# Patient Record
Sex: FEMALE | Race: BLACK OR AFRICAN AMERICAN | NOT HISPANIC OR LATINO | URBAN - METROPOLITAN AREA
[De-identification: names, ages, dates, MRNs, and addresses within clinical notes are randomized per-mention and may not be internally consistent; named-entity substitution may affect disease eponyms.]

---

## 2020-06-09 ENCOUNTER — TELEPHONE ENCOUNTER (OUTPATIENT)
Dept: URBAN - METROPOLITAN AREA CLINIC 9 | Facility: CLINIC | Age: 75
End: 2020-06-09

## 2020-08-12 ENCOUNTER — OFFICE VISIT (OUTPATIENT)
Dept: URBAN - METROPOLITAN AREA CLINIC 9 | Facility: CLINIC | Age: 75
End: 2020-08-12

## 2020-08-18 ENCOUNTER — OFFICE VISIT (OUTPATIENT)
Dept: URBAN - METROPOLITAN AREA CLINIC 9 | Facility: CLINIC | Age: 75
End: 2020-08-18

## 2020-09-30 ENCOUNTER — OFFICE VISIT (OUTPATIENT)
Dept: URBAN - METROPOLITAN AREA CLINIC 9 | Facility: CLINIC | Age: 75
End: 2020-09-30

## 2020-10-01 ENCOUNTER — OFFICE VISIT (OUTPATIENT)
Age: 75
End: 2020-10-01

## 2020-10-26 ENCOUNTER — OFFICE VISIT (OUTPATIENT)
Dept: URBAN - METROPOLITAN AREA CLINIC 9 | Facility: CLINIC | Age: 75
End: 2020-10-26

## 2020-11-04 ENCOUNTER — TELEPHONE ENCOUNTER (OUTPATIENT)
Dept: URBAN - METROPOLITAN AREA CLINIC 9 | Facility: CLINIC | Age: 75
End: 2020-11-04

## 2020-11-18 ENCOUNTER — OFFICE VISIT (OUTPATIENT)
Dept: URBAN - METROPOLITAN AREA CLINIC 9 | Facility: CLINIC | Age: 75
End: 2020-11-18

## 2020-12-22 ENCOUNTER — TELEPHONE ENCOUNTER (OUTPATIENT)
Dept: URBAN - METROPOLITAN AREA CLINIC 9 | Facility: CLINIC | Age: 75
End: 2020-12-22

## 2021-02-09 ENCOUNTER — TELEPHONE ENCOUNTER (OUTPATIENT)
Dept: URBAN - METROPOLITAN AREA CLINIC 9 | Facility: CLINIC | Age: 76
End: 2021-02-09

## 2021-03-03 ENCOUNTER — OFFICE VISIT (OUTPATIENT)
Age: 76
End: 2021-03-03

## 2021-03-24 ENCOUNTER — OFFICE VISIT (OUTPATIENT)
Age: 76
End: 2021-03-24

## 2021-04-19 ENCOUNTER — TELEPHONE ENCOUNTER (OUTPATIENT)
Dept: URBAN - METROPOLITAN AREA CLINIC 9 | Facility: CLINIC | Age: 76
End: 2021-04-19

## 2021-04-26 ENCOUNTER — OFFICE VISIT (OUTPATIENT)
Dept: URBAN - METROPOLITAN AREA CLINIC 9 | Facility: CLINIC | Age: 76
End: 2021-04-26

## 2021-05-26 ENCOUNTER — OFFICE VISIT (OUTPATIENT)
Dept: URBAN - METROPOLITAN AREA CLINIC 9 | Facility: CLINIC | Age: 76
End: 2021-05-26

## 2021-06-25 ENCOUNTER — TELEPHONE ENCOUNTER (OUTPATIENT)
Dept: URBAN - METROPOLITAN AREA CLINIC 9 | Facility: CLINIC | Age: 76
End: 2021-06-25

## 2021-07-02 ENCOUNTER — TELEPHONE ENCOUNTER (OUTPATIENT)
Dept: URBAN - METROPOLITAN AREA CLINIC 9 | Facility: CLINIC | Age: 76
End: 2021-07-02

## 2021-08-10 ENCOUNTER — TELEPHONE ENCOUNTER (OUTPATIENT)
Dept: URBAN - METROPOLITAN AREA CLINIC 9 | Facility: CLINIC | Age: 76
End: 2021-08-10

## 2021-08-25 ENCOUNTER — TELEPHONE ENCOUNTER (OUTPATIENT)
Dept: URBAN - METROPOLITAN AREA CLINIC 9 | Facility: CLINIC | Age: 76
End: 2021-08-25

## 2021-09-20 ENCOUNTER — TELEPHONE ENCOUNTER (OUTPATIENT)
Dept: URBAN - METROPOLITAN AREA CLINIC 9 | Facility: CLINIC | Age: 76
End: 2021-09-20

## 2022-01-12 ENCOUNTER — OFFICE VISIT (OUTPATIENT)
Dept: URBAN - METROPOLITAN AREA CLINIC 9 | Facility: CLINIC | Age: 77
End: 2022-01-12

## 2022-01-14 ENCOUNTER — OFFICE VISIT (OUTPATIENT)
Dept: URBAN - METROPOLITAN AREA CLINIC 9 | Facility: CLINIC | Age: 77
End: 2022-01-14

## 2022-01-28 ENCOUNTER — OFFICE VISIT (OUTPATIENT)
Dept: URBAN - METROPOLITAN AREA SURGERY CENTER 9 | Facility: SURGERY CENTER | Age: 77
End: 2022-01-28

## 2022-02-16 ENCOUNTER — TELEPHONE ENCOUNTER (OUTPATIENT)
Dept: URBAN - METROPOLITAN AREA CLINIC 9 | Facility: CLINIC | Age: 77
End: 2022-02-16

## 2022-02-16 ENCOUNTER — OFFICE VISIT (OUTPATIENT)
Dept: URBAN - METROPOLITAN AREA CLINIC 9 | Facility: CLINIC | Age: 77
End: 2022-02-16

## 2022-03-01 ENCOUNTER — TELEPHONE ENCOUNTER (OUTPATIENT)
Dept: URBAN - METROPOLITAN AREA CLINIC 9 | Facility: CLINIC | Age: 77
End: 2022-03-01

## 2022-03-01 ENCOUNTER — OFFICE VISIT (OUTPATIENT)
Dept: URBAN - METROPOLITAN AREA CLINIC 9 | Facility: CLINIC | Age: 77
End: 2022-03-01

## 2022-04-23 ENCOUNTER — OFFICE VISIT (OUTPATIENT)
Age: 77
End: 2022-04-23

## 2022-04-26 ENCOUNTER — TELEPHONE ENCOUNTER (OUTPATIENT)
Dept: URBAN - METROPOLITAN AREA CLINIC 9 | Facility: CLINIC | Age: 77
End: 2022-04-26

## 2022-05-12 ENCOUNTER — TELEPHONE ENCOUNTER (OUTPATIENT)
Dept: URBAN - METROPOLITAN AREA CLINIC 9 | Facility: CLINIC | Age: 77
End: 2022-05-12

## 2022-05-13 ENCOUNTER — TELEPHONE ENCOUNTER (OUTPATIENT)
Dept: URBAN - METROPOLITAN AREA CLINIC 9 | Facility: CLINIC | Age: 77
End: 2022-05-13

## 2022-05-31 ENCOUNTER — TELEPHONE ENCOUNTER (OUTPATIENT)
Dept: URBAN - METROPOLITAN AREA CLINIC 9 | Facility: CLINIC | Age: 77
End: 2022-05-31

## 2022-06-07 ENCOUNTER — TELEPHONE ENCOUNTER (OUTPATIENT)
Dept: URBAN - METROPOLITAN AREA CLINIC 9 | Facility: CLINIC | Age: 77
End: 2022-06-07

## 2022-06-08 ENCOUNTER — TELEPHONE ENCOUNTER (OUTPATIENT)
Dept: URBAN - METROPOLITAN AREA CLINIC 9 | Facility: CLINIC | Age: 77
End: 2022-06-08

## 2022-06-13 ENCOUNTER — OFFICE VISIT (OUTPATIENT)
Dept: URBAN - METROPOLITAN AREA SURGERY CENTER 9 | Facility: SURGERY CENTER | Age: 77
End: 2022-06-13

## 2022-07-09 ENCOUNTER — TELEPHONE ENCOUNTER (OUTPATIENT)
Dept: URBAN - METROPOLITAN AREA CLINIC 121 | Facility: CLINIC | Age: 77
End: 2022-07-09

## 2022-07-09 RX ORDER — OMEPRAZOLE 20 MG/1
CAPSULE, DELAYED RELEASE ORAL TWICE A DAY
Refills: 11 | OUTPATIENT
Start: 2013-07-08 | End: 2014-08-04

## 2022-07-09 RX ORDER — OMEPRAZOLE 20 MG/1
CAPSULE, DELAYED RELEASE ORAL TWICE A DAY
Refills: 2 | OUTPATIENT
Start: 2012-05-18 | End: 2013-07-08

## 2022-07-09 RX ORDER — OMEPRAZOLE 20 MG/1
CAPSULE, DELAYED RELEASE ORAL
Refills: 0 | OUTPATIENT
Start: 2012-05-18 | End: 2013-04-03

## 2022-07-09 RX ORDER — CYCLOBENZAPRINE HYDROCHLORIDE 5 MG/1
TABLET, FILM COATED ORAL
Refills: 0 | OUTPATIENT
Start: 2012-05-18 | End: 2013-07-08

## 2022-07-10 ENCOUNTER — TELEPHONE ENCOUNTER (OUTPATIENT)
Dept: URBAN - METROPOLITAN AREA CLINIC 121 | Facility: CLINIC | Age: 77
End: 2022-07-10

## 2022-07-10 RX ORDER — HYDROCORTISONE ACETATE 25 MG/1
INSERT ONE INTO RECTUM QHS PRN SUPPOSITORY RECTAL TAKE AS DIRECTED
Refills: 1 | Status: ACTIVE | COMMUNITY
Start: 2012-06-04

## 2022-07-10 RX ORDER — METFORMIN HCL 500 MG/1
TABLET ORAL
Refills: 0 | Status: ACTIVE | COMMUNITY
Start: 2012-05-18

## 2022-07-10 RX ORDER — METOCLOPRAMIDE HYDROCHLORIDE 5 MG/1
TABLET ORAL
Refills: 0 | Status: ACTIVE | COMMUNITY
Start: 2012-05-18

## 2022-07-10 RX ORDER — OMEPRAZOLE 40 MG/1
CAPSULE, DELAYED RELEASE ORAL TWICE A DAY
Refills: 2 | Status: ACTIVE | COMMUNITY
Start: 2012-06-04

## 2022-07-10 RX ORDER — TRAMADOL HYDROCHLORIDE 50 MG/1
TABLET ORAL
Refills: 0 | Status: ACTIVE | COMMUNITY
Start: 2012-05-18

## 2022-07-10 RX ORDER — LABETALOL HYDROCHLORIDE 300 MG/1
TABLET, FILM COATED ORAL
Refills: 0 | Status: ACTIVE | COMMUNITY
Start: 2012-05-18

## 2022-07-10 RX ORDER — HYDROXYZINE HYDROCHLORIDE 10 MG/1
TABLET ORAL
Refills: 0 | Status: ACTIVE | COMMUNITY
Start: 2012-05-18

## 2022-07-10 RX ORDER — OMEPRAZOLE 20 MG/1
TAKE ONE CAPSULE BY MOUTH TWICE DAILY CAPSULE, DELAYED RELEASE ORAL
Refills: 0 | Status: ACTIVE | COMMUNITY
Start: 2014-08-04

## 2022-07-10 RX ORDER — FLUTICASONE PROPIONATE AND SALMETEROL 50; 250 UG/1; UG/1
POWDER RESPIRATORY (INHALATION)
Refills: 0 | Status: ACTIVE | COMMUNITY
Start: 2012-05-18

## 2022-07-10 RX ORDER — SALSALATE 750 MG/1
TABLET, FILM COATED ORAL
Refills: 0 | Status: ACTIVE | COMMUNITY
Start: 2012-05-18

## 2022-07-10 RX ORDER — ALBUTEROL SULFATE 90 UG/1
AEROSOL, METERED RESPIRATORY (INHALATION)
Refills: 0 | Status: ACTIVE | COMMUNITY
Start: 2012-05-18

## 2022-07-10 RX ORDER — OMEGA-3-ACID ETHYL ESTERS 1 G/1
CAPSULE, LIQUID FILLED ORAL
Refills: 0 | Status: ACTIVE | COMMUNITY
Start: 2012-05-18

## 2022-07-10 RX ORDER — FAMOTIDINE 40 MG/1
ONE PO QHS TABLET, FILM COATED ORAL
Refills: 2 | Status: ACTIVE | COMMUNITY
Start: 2013-01-23

## 2022-07-10 RX ORDER — CLONIDINE HYDROCHLORIDE 0.2 MG/1
TABLET ORAL
Refills: 0 | Status: ACTIVE | COMMUNITY
Start: 2012-05-18

## 2022-07-11 ENCOUNTER — TELEPHONE ENCOUNTER (OUTPATIENT)
Dept: URBAN - METROPOLITAN AREA CLINIC 9 | Facility: CLINIC | Age: 77
End: 2022-07-11

## 2022-07-12 ENCOUNTER — OFFICE VISIT (OUTPATIENT)
Dept: URBAN - METROPOLITAN AREA SURGERY CENTER 9 | Facility: SURGERY CENTER | Age: 77
End: 2022-07-12

## 2022-07-19 ENCOUNTER — TELEPHONE ENCOUNTER (OUTPATIENT)
Dept: URBAN - METROPOLITAN AREA CLINIC 9 | Facility: CLINIC | Age: 77
End: 2022-07-19

## 2022-07-30 ENCOUNTER — TELEPHONE ENCOUNTER (OUTPATIENT)
Age: 77
End: 2022-07-30

## 2022-07-30 RX ORDER — PANTOPRAZOLE SODIUM 40 MG/1
1 (ONE) TABLET, DELAYED RELEASE ORAL
Qty: 0 | Refills: 2 | OUTPATIENT
Start: 2018-07-19 | End: 2018-08-02

## 2022-07-30 RX ORDER — FAMOTIDINE 10 MG
1 (ONE) TABLET ORAL
Qty: 0 | Refills: 16 | OUTPATIENT
Start: 2018-02-28 | End: 2018-03-14

## 2022-07-30 RX ORDER — PANTOPRAZOLE SODIUM 40 MG/1
1 (ONE) TABLET, DELAYED RELEASE ORAL
Qty: 0 | Refills: 2 | OUTPATIENT
Start: 2018-09-27 | End: 2018-09-30

## 2022-07-30 RX ORDER — PANTOPRAZOLE SODIUM 40 MG/1
1 (ONE) TABLET, DELAYED RELEASE ORAL
Qty: 0 | Refills: 2 | OUTPATIENT
Start: 2019-05-14 | End: 2019-05-28

## 2022-07-30 RX ORDER — METRONIDAZOLE 250 MG/1
1 (ONE) TABLET ORAL
Qty: 0 | Refills: 2 | OUTPATIENT
Start: 2018-03-15 | End: 2018-03-29

## 2022-07-30 RX ORDER — WHEAT DEXTRIN 3 G/4 G
1 (ONE) POWDER (GRAM) ORAL DAILY
Qty: 0 | Refills: 2 | OUTPATIENT
Start: 2018-02-28 | End: 2018-03-30

## 2022-07-30 RX ORDER — DOXYCYCLINE HYCLATE 100 MG/1
1 (ONE) CAPSULE ORAL
Qty: 0 | Refills: 2 | OUTPATIENT
Start: 2018-03-15 | End: 2018-03-29

## 2022-07-30 RX ORDER — RIFABUTIN 150 MG/1
2 (TWO) CAPSULE ORAL DAILY
Qty: 0 | Refills: 2 | OUTPATIENT
Start: 2019-06-26 | End: 2019-07-06

## 2022-07-30 RX ORDER — AMOXICILLIN 500 MG/1
2 (TWO) CAPSULE ORAL
Qty: 0 | Refills: 2 | OUTPATIENT
Start: 2018-09-17 | End: 2018-10-01

## 2022-07-30 RX ORDER — AMOXICILLIN 500 MG/1
2 (TWO) CAPSULE ORAL
Qty: 0 | Refills: 2 | OUTPATIENT
Start: 2019-06-26 | End: 2019-07-06

## 2022-07-30 RX ORDER — AMOXICILLIN 500 MG/1
2 (TWO) CAPSULE ORAL
Qty: 0 | Refills: 2 | OUTPATIENT
Start: 2018-07-19 | End: 2018-08-02

## 2022-07-30 RX ORDER — AMOXICILLIN 500 MG/1
2 (TWO) CAPSULE ORAL
Qty: 0 | Refills: 2 | OUTPATIENT
Start: 2018-12-03 | End: 2018-12-17

## 2022-07-30 RX ORDER — PEPPERMINT OIL 90 MG
1-2 CAPSULE, DELAYED, AND EXTENDED RELEASE ORAL
Qty: 0 | Refills: 2 | OUTPATIENT
Start: 2020-06-09 | End: 2021-04-26

## 2022-07-30 RX ORDER — METRONIDAZOLE 500 MG/1
1 (ONE) TABLET ORAL
Qty: 0 | Refills: 2 | OUTPATIENT
Start: 2018-09-27 | End: 2018-10-11

## 2022-07-30 RX ORDER — BISMUTH SUBSALICYLATE 262 MG/1
2 (TWO) TABLET, CHEWABLE ORAL
Qty: 0 | Refills: 2 | OUTPATIENT
Start: 2018-03-14 | End: 2018-03-28

## 2022-07-30 RX ORDER — LEVOFLOXACIN 500 MG/1
1 (ONE) TABLET, FILM COATED ORAL DAILY
Qty: 0 | Refills: 2 | OUTPATIENT
Start: 2018-09-17 | End: 2018-10-01

## 2022-07-30 RX ORDER — AMOXICILLIN 500 MG/1
2 (TWO) CAPSULE ORAL
Qty: 0 | Refills: 2 | OUTPATIENT
Start: 2018-09-27 | End: 2018-10-11

## 2022-07-30 RX ORDER — AMOXICILLIN 500 MG/1
2 (TWO) CAPSULE ORAL
Qty: 0 | Refills: 2 | OUTPATIENT
Start: 2019-05-14 | End: 2019-05-28

## 2022-07-30 RX ORDER — PANTOPRAZOLE SODIUM 40 MG/1
1 (ONE) TABLET, DELAYED RELEASE ORAL
Qty: 0 | Refills: 2 | OUTPATIENT
Start: 2018-03-15 | End: 2018-03-29

## 2022-07-30 RX ORDER — PANTOPRAZOLE SODIUM 40 MG/1
1 (ONE) TABLET, DELAYED RELEASE ORAL
Qty: 0 | Refills: 2 | OUTPATIENT
Start: 2018-11-02 | End: 2018-11-05

## 2022-07-30 RX ORDER — AMOXICILLIN AND CLAVULANATE POTASSIUM 600; 42.9 MG/5ML; MG/5ML
1 (ONE) FOR SUSPENSION ORAL
Qty: 0 | Refills: 2 | OUTPATIENT
Start: 2018-12-03 | End: 2018-12-06

## 2022-07-30 RX ORDER — PANTOPRAZOLE SODIUM 40 MG/1
1 (ONE) TABLET, DELAYED RELEASE ORAL
Qty: 0 | Refills: 2 | OUTPATIENT
Start: 2019-07-12 | End: 2019-07-22

## 2022-07-30 RX ORDER — PANTOPRAZOLE SODIUM 40 MG/1
1 (ONE) TABLET, DELAYED RELEASE ORAL
Qty: 0 | Refills: 2 | OUTPATIENT
Start: 2018-12-03 | End: 2018-12-17

## 2022-07-30 RX ORDER — WHEAT DEXTRIN 3 G/4 G
1 (ONE) POWDER (GRAM) ORAL DAILY
Qty: 0 | Refills: 2 | OUTPATIENT
Start: 2019-04-04 | End: 2019-05-04

## 2022-07-30 RX ORDER — WHEAT DEXTRIN 3 G/4 G
1 (ONE) POWDER (GRAM) ORAL DAILY
Qty: 0 | Refills: 2 | OUTPATIENT
Start: 2020-08-18 | End: 2020-09-17

## 2022-07-30 RX ORDER — PANTOPRAZOLE SODIUM 40 MG/1
1 (ONE) TABLET, DELAYED RELEASE ORAL
Qty: 0 | Refills: 2 | OUTPATIENT
Start: 2018-09-17 | End: 2018-10-01

## 2022-07-31 ENCOUNTER — TELEPHONE ENCOUNTER (OUTPATIENT)
Age: 77
End: 2022-07-31

## 2022-07-31 RX ORDER — WHEAT DEXTRIN 3 G/4 G
1 (ONE) POWDER (GRAM) ORAL DAILY
Qty: 0 | Refills: 2 | Status: ACTIVE | COMMUNITY
Start: 2018-02-28

## 2022-07-31 RX ORDER — LINACLOTIDE 72 UG/1
1 (ONE) CAPSULE, GELATIN COATED ORAL DAILY
Qty: 0 | Refills: 4 | Status: ACTIVE | COMMUNITY
Start: 2022-03-01

## 2022-07-31 RX ORDER — LORATADINE 5 MG
17 GRAMS TABLET,CHEWABLE ORAL
Qty: 0 | Refills: 16 | Status: ACTIVE | COMMUNITY
Start: 2020-08-18

## 2022-07-31 RX ORDER — LORATADINE 5 MG
17 GRAMS TABLET,CHEWABLE ORAL
Qty: 0 | Refills: 16 | Status: ACTIVE | COMMUNITY
Start: 2020-10-26

## 2022-07-31 RX ORDER — AMOXICILLIN 500 MG/1
2 (TWO) CAPSULE ORAL
Qty: 0 | Refills: 2 | Status: ACTIVE | COMMUNITY
Start: 2019-05-13

## 2022-07-31 RX ORDER — PANTOPRAZOLE SODIUM 40 MG/1
1 (ONE) TABLET, DELAYED RELEASE ORAL
Qty: 0 | Refills: 2 | Status: ACTIVE | COMMUNITY
Start: 2018-09-17

## 2022-07-31 RX ORDER — PANTOPRAZOLE SODIUM 40 MG/1
1 (ONE) TABLET, DELAYED RELEASE ORAL
Qty: 0 | Refills: 2 | Status: ACTIVE | COMMUNITY
Start: 2018-03-15

## 2022-07-31 RX ORDER — SUCRALFATE 1 G/1
1 (ONE) TABLET ORAL
Qty: 0 | Refills: 4 | Status: ACTIVE | COMMUNITY
Start: 2021-06-25

## 2022-07-31 RX ORDER — PANTOPRAZOLE SODIUM 40 MG/1
1 (ONE) TABLET, DELAYED RELEASE ORAL
Qty: 0 | Refills: 2 | Status: ACTIVE | COMMUNITY
Start: 2018-12-03

## 2022-07-31 RX ORDER — SUCRALFATE 1 G/1
1 (ONE) TABLET ORAL
Qty: 0 | Refills: 4 | Status: ACTIVE | COMMUNITY
Start: 2021-06-24

## 2022-07-31 RX ORDER — WHEAT DEXTRIN 3 G/4 G
1 (ONE) POWDER (GRAM) ORAL DAILY
Qty: 0 | Refills: 2 | Status: ACTIVE | COMMUNITY
Start: 2019-04-04

## 2022-07-31 RX ORDER — AMOXICILLIN 500 MG/1
2 (TWO) CAPSULE ORAL
Qty: 0 | Refills: 2 | Status: ACTIVE | COMMUNITY
Start: 2018-12-03

## 2022-07-31 RX ORDER — LORATADINE 5 MG
17 GRAMS TABLET,CHEWABLE ORAL
Qty: 0 | Refills: 16 | Status: ACTIVE | COMMUNITY
Start: 2021-04-26

## 2022-07-31 RX ORDER — AMOXICILLIN 500 MG/1
2 (TWO) CAPSULE ORAL
Qty: 0 | Refills: 2 | Status: ACTIVE | COMMUNITY
Start: 2018-09-17

## 2022-07-31 RX ORDER — METRONIDAZOLE 500 MG/1
1 (ONE) TABLET ORAL
Qty: 0 | Refills: 2 | Status: ACTIVE | COMMUNITY
Start: 2018-09-27

## 2022-07-31 RX ORDER — PANTOPRAZOLE SODIUM 40 MG/1
1 (ONE) TABLET, DELAYED RELEASE ORAL
Qty: 0 | Refills: 2 | Status: ACTIVE | COMMUNITY
Start: 2022-07-19

## 2022-07-31 RX ORDER — LEVOFLOXACIN 500 MG/1
1 (ONE) TABLET, FILM COATED ORAL DAILY
Qty: 0 | Refills: 2 | Status: ACTIVE | COMMUNITY
Start: 2018-09-17

## 2022-07-31 RX ORDER — PANTOPRAZOLE SODIUM 40 MG/1
1 (ONE) TABLET, DELAYED RELEASE ORAL
Qty: 0 | Refills: 2 | Status: ACTIVE | COMMUNITY
Start: 2022-03-01

## 2022-07-31 RX ORDER — PANTOPRAZOLE 20 MG/1
1 (ONE) TABLET, DELAYED RELEASE ORAL
Qty: 0 | Refills: 16 | Status: ACTIVE | COMMUNITY
Start: 2020-05-22

## 2022-07-31 RX ORDER — LINACLOTIDE 72 UG/1
1 (ONE) CAPSULE, GELATIN COATED ORAL DAILY
Qty: 0 | Refills: 4 | Status: ACTIVE | COMMUNITY
Start: 2021-04-26

## 2022-07-31 RX ORDER — AMOXICILLIN 500 MG/1
2 (TWO) CAPSULE ORAL
Qty: 0 | Refills: 2 | Status: ACTIVE | COMMUNITY
Start: 2018-09-27

## 2022-07-31 RX ORDER — LINACLOTIDE 72 UG/1
1 (ONE) CAPSULE, GELATIN COATED ORAL DAILY
Qty: 0 | Refills: 2 | Status: ACTIVE | COMMUNITY
Start: 2022-05-12

## 2022-07-31 RX ORDER — LINACLOTIDE 72 UG/1
1 (ONE) CAPSULE, GELATIN COATED ORAL DAILY
Qty: 0 | Refills: 4 | Status: ACTIVE | COMMUNITY
Start: 2020-12-22

## 2022-07-31 RX ORDER — PANTOPRAZOLE SODIUM 40 MG/1
1 (ONE) TABLET, DELAYED RELEASE ORAL
Qty: 0 | Refills: 2 | Status: ACTIVE | COMMUNITY
Start: 2019-05-14

## 2022-07-31 RX ORDER — AMOXICILLIN AND CLAVULANATE POTASSIUM 600; 42.9 MG/5ML; MG/5ML
1 (ONE) FOR SUSPENSION ORAL
Qty: 0 | Refills: 2 | Status: ACTIVE | COMMUNITY
Start: 2018-12-03

## 2022-07-31 RX ORDER — LORATADINE 5 MG
17 GRAMS TABLET,CHEWABLE ORAL
Qty: 0 | Refills: 16 | Status: ACTIVE | COMMUNITY
Start: 2022-03-01

## 2022-07-31 RX ORDER — SUCRALFATE 1 G/1
1 (ONE) TABLET ORAL
Qty: 0 | Refills: 2 | Status: ACTIVE | COMMUNITY
Start: 2021-09-20

## 2022-07-31 RX ORDER — METRONIDAZOLE 250 MG/1
1 (ONE) TABLET ORAL
Qty: 0 | Refills: 2 | Status: ACTIVE | COMMUNITY
Start: 2018-03-14

## 2022-07-31 RX ORDER — BISMUTH SUBSALICYLATE 262 MG/1
2 (TWO) TABLET, CHEWABLE ORAL
Qty: 0 | Refills: 2 | Status: ACTIVE | COMMUNITY
Start: 2018-03-14

## 2022-07-31 RX ORDER — PEPPERMINT OIL 90 MG
1-2 CAPSULE, DELAYED, AND EXTENDED RELEASE ORAL
Qty: 0 | Refills: 2 | Status: ACTIVE | COMMUNITY
Start: 2020-06-09

## 2022-07-31 RX ORDER — AMOXICILLIN 500 MG/1
2 (TWO) CAPSULE ORAL
Qty: 0 | Refills: 2 | Status: ACTIVE | COMMUNITY
Start: 2019-06-26

## 2022-07-31 RX ORDER — DOXYCYCLINE HYCLATE 100 MG/1
1 (ONE) CAPSULE ORAL
Qty: 0 | Refills: 2 | Status: ACTIVE | COMMUNITY
Start: 2018-03-14

## 2022-07-31 RX ORDER — FAMOTIDINE 10 MG
1 (ONE) TABLET ORAL
Qty: 0 | Refills: 16 | Status: ACTIVE | COMMUNITY
Start: 2018-02-28

## 2022-07-31 RX ORDER — SUCRALFATE 1 G/1
1 (ONE) TABLET ORAL
Qty: 0 | Refills: 2 | Status: ACTIVE | COMMUNITY
Start: 2022-03-01

## 2022-07-31 RX ORDER — LINACLOTIDE 72 UG/1
1 (ONE) CAPSULE, GELATIN COATED ORAL DAILY
Qty: 0 | Refills: 2 | Status: ACTIVE | COMMUNITY
Start: 2022-05-13

## 2022-07-31 RX ORDER — PANTOPRAZOLE SODIUM 40 MG/1
1 (ONE) TABLET, DELAYED RELEASE ORAL
Qty: 0 | Refills: 2 | Status: ACTIVE | COMMUNITY
Start: 2018-09-27

## 2022-07-31 RX ORDER — DOXYCYCLINE HYCLATE 100 MG/1
1 (ONE) CAPSULE ORAL
Qty: 0 | Refills: 2 | Status: ACTIVE | COMMUNITY
Start: 2018-03-15

## 2022-07-31 RX ORDER — AMOXICILLIN 500 MG/1
2 (TWO) CAPSULE ORAL
Qty: 0 | Refills: 2 | Status: ACTIVE | COMMUNITY
Start: 2019-05-14

## 2022-07-31 RX ORDER — PANTOPRAZOLE SODIUM 40 MG/1
1 (ONE) TABLET, DELAYED RELEASE ORAL
Qty: 0 | Refills: 2 | Status: ACTIVE | COMMUNITY
Start: 2019-07-12

## 2022-07-31 RX ORDER — AMOXICILLIN 500 MG/1
2 (TWO) CAPSULE ORAL
Qty: 0 | Refills: 2 | Status: ACTIVE | COMMUNITY
Start: 2018-07-19

## 2022-07-31 RX ORDER — PANTOPRAZOLE SODIUM 40 MG/1
1 (ONE) TABLET, DELAYED RELEASE ORAL
Qty: 0 | Refills: 2 | Status: ACTIVE | COMMUNITY
Start: 2020-11-04

## 2022-07-31 RX ORDER — LORATADINE 5 MG
17 GRAMS TABLET,CHEWABLE ORAL
Qty: 0 | Refills: 16 | Status: ACTIVE | COMMUNITY
Start: 2022-01-12

## 2022-07-31 RX ORDER — SUCRALFATE 1 G/1
1 (ONE) TABLET ORAL
Qty: 0 | Refills: 2 | Status: ACTIVE | COMMUNITY
Start: 2021-07-02

## 2022-07-31 RX ORDER — PANTOPRAZOLE SODIUM 40 MG/1
1 (ONE) TABLET, DELAYED RELEASE ORAL
Qty: 0 | Refills: 2 | Status: ACTIVE | COMMUNITY
Start: 2022-01-12

## 2022-07-31 RX ORDER — RIFABUTIN 150 MG/1
2 (TWO) CAPSULE ORAL
Qty: 0 | Refills: 2 | Status: ACTIVE | COMMUNITY
Start: 2019-06-26

## 2022-07-31 RX ORDER — WHEAT DEXTRIN 3 G/4 G
1 (ONE) POWDER (GRAM) ORAL DAILY
Qty: 0 | Refills: 2 | Status: ACTIVE | COMMUNITY
Start: 2020-08-18

## 2022-07-31 RX ORDER — PANTOPRAZOLE SODIUM 40 MG/1
1 (ONE) TABLET, DELAYED RELEASE ORAL
Qty: 0 | Refills: 2 | Status: ACTIVE | COMMUNITY
Start: 2021-04-26

## 2022-07-31 RX ORDER — PANTOPRAZOLE SODIUM 40 MG/1
1 (ONE) TABLET, DELAYED RELEASE ORAL
Qty: 0 | Refills: 2 | Status: ACTIVE | COMMUNITY
Start: 2021-08-25

## 2022-07-31 RX ORDER — PANTOPRAZOLE SODIUM 40 MG/1
1 (ONE) TABLET, DELAYED RELEASE ORAL
Qty: 0 | Refills: 2 | Status: ACTIVE | COMMUNITY
Start: 2021-05-26

## 2022-07-31 RX ORDER — PANTOPRAZOLE SODIUM 40 MG/1
1 (ONE) TABLET, DELAYED RELEASE ORAL
Qty: 0 | Refills: 5 | Status: ACTIVE | COMMUNITY
Start: 2020-10-26

## 2022-07-31 RX ORDER — PANTOPRAZOLE SODIUM 40 MG/1
1 (ONE) TABLET, DELAYED RELEASE ORAL
Qty: 0 | Refills: 2 | Status: ACTIVE | COMMUNITY
Start: 2018-11-02

## 2022-07-31 RX ORDER — PANTOPRAZOLE SODIUM 40 MG/1
1 (ONE) TABLET, DELAYED RELEASE ORAL
Qty: 0 | Refills: 2 | Status: ACTIVE | COMMUNITY
Start: 2018-07-19

## 2022-07-31 RX ORDER — LINACLOTIDE 72 UG/1
1 (ONE) CAPSULE, GELATIN COATED ORAL DAILY
Qty: 0 | Refills: 4 | Status: ACTIVE | COMMUNITY
Start: 2022-01-12

## 2022-07-31 RX ORDER — PANTOPRAZOLE SODIUM 40 MG/1
1 (ONE) TABLET, DELAYED RELEASE ORAL
Qty: 0 | Refills: 2 | Status: ACTIVE | COMMUNITY
Start: 2019-05-13

## 2022-07-31 RX ORDER — LINACLOTIDE 72 UG/1
1 (ONE) CAPSULE, GELATIN COATED ORAL DAILY
Qty: 0 | Refills: 4 | Status: ACTIVE | COMMUNITY
Start: 2020-08-18

## 2022-07-31 RX ORDER — LINACLOTIDE 72 UG/1
1 (ONE) CAPSULE, GELATIN COATED ORAL DAILY
Qty: 0 | Refills: 4 | Status: ACTIVE | COMMUNITY
Start: 2021-05-26

## 2022-07-31 RX ORDER — SUCRALFATE 1 G/1
1 (ONE) TABLET ORAL
Qty: 0 | Refills: 2 | Status: ACTIVE | COMMUNITY
Start: 2022-01-12

## 2022-07-31 RX ORDER — LORATADINE 5 MG
17 GRAMS TABLET,CHEWABLE ORAL
Qty: 0 | Refills: 16 | Status: ACTIVE | COMMUNITY
Start: 2021-05-26

## 2022-07-31 RX ORDER — LINACLOTIDE 72 UG/1
1 (ONE) CAPSULE, GELATIN COATED ORAL DAILY
Qty: 0 | Refills: 4 | Status: ACTIVE | COMMUNITY
Start: 2020-10-26

## 2022-07-31 RX ORDER — PANTOPRAZOLE SODIUM 40 MG/1
1 (ONE) TABLET, DELAYED RELEASE ORAL
Qty: 0 | Refills: 2 | Status: ACTIVE | COMMUNITY
Start: 2018-03-14

## 2022-07-31 RX ORDER — SUCRALFATE 1 G/1
1 (ONE) TABLET ORAL
Qty: 0 | Refills: 4 | Status: ACTIVE | COMMUNITY
Start: 2021-05-26

## 2022-07-31 RX ORDER — METRONIDAZOLE 250 MG/1
1 (ONE) TABLET ORAL
Qty: 0 | Refills: 2 | Status: ACTIVE | COMMUNITY
Start: 2018-03-15

## 2022-08-24 ENCOUNTER — OFFICE VISIT (OUTPATIENT)
Dept: URBAN - METROPOLITAN AREA SURGERY CENTER 9 | Facility: SURGERY CENTER | Age: 77
End: 2022-08-24

## 2022-08-26 ENCOUNTER — OFFICE VISIT (OUTPATIENT)
Dept: URBAN - METROPOLITAN AREA CLINIC 9 | Facility: CLINIC | Age: 77
End: 2022-08-26

## 2022-08-31 ENCOUNTER — OFFICE VISIT (OUTPATIENT)
Dept: URBAN - METROPOLITAN AREA SURGERY CENTER 9 | Facility: SURGERY CENTER | Age: 77
End: 2022-08-31

## 2022-09-01 ENCOUNTER — OFFICE VISIT (OUTPATIENT)
Dept: URBAN - METROPOLITAN AREA CLINIC 9 | Facility: CLINIC | Age: 77
End: 2022-09-01

## 2022-09-13 ENCOUNTER — OFFICE VISIT (OUTPATIENT)
Dept: URBAN - METROPOLITAN AREA CLINIC 9 | Facility: CLINIC | Age: 77
End: 2022-09-13

## 2022-11-17 PROBLEM — 428283002 HISTORY OF POLYP OF COLON: Status: ACTIVE | Noted: 2022-11-17

## 2022-11-29 ENCOUNTER — TELEPHONE ENCOUNTER (OUTPATIENT)
Dept: URBAN - METROPOLITAN AREA SURGERY CENTER 9 | Facility: SURGERY CENTER | Age: 77
End: 2022-11-29

## 2022-11-30 ENCOUNTER — OFFICE VISIT (OUTPATIENT)
Dept: URBAN - METROPOLITAN AREA SURGERY CENTER 9 | Facility: SURGERY CENTER | Age: 77
End: 2022-11-30

## 2022-12-01 ENCOUNTER — CLAIMS CREATED FROM THE CLAIM WINDOW (OUTPATIENT)
Dept: URBAN - METROPOLITAN AREA SURGERY CENTER 9 | Facility: SURGERY CENTER | Age: 77
End: 2022-12-01
Payer: COMMERCIAL

## 2022-12-01 ENCOUNTER — CLAIMS CREATED FROM THE CLAIM WINDOW (OUTPATIENT)
Dept: URBAN - METROPOLITAN AREA CLINIC 4 | Facility: CLINIC | Age: 77
End: 2022-12-01
Payer: COMMERCIAL

## 2022-12-01 ENCOUNTER — TELEPHONE ENCOUNTER (OUTPATIENT)
Dept: URBAN - METROPOLITAN AREA CLINIC 9 | Facility: CLINIC | Age: 77
End: 2022-12-01

## 2022-12-01 DIAGNOSIS — D12.4 BENIGN NEOPLASM OF DESCENDING COLON: ICD-10-CM

## 2022-12-01 DIAGNOSIS — K64.1 BLEEDING GRADE II HEMORRHOIDS: ICD-10-CM

## 2022-12-01 DIAGNOSIS — Z86.010 ADENOMAS PERSONAL HISTORY OF COLONIC POLYPS: ICD-10-CM

## 2022-12-01 DIAGNOSIS — K63.5 BENIGN COLON POLYP: ICD-10-CM

## 2022-12-01 DIAGNOSIS — K57.30 ACQUIRED DIVERTICULOSIS OF COLON: ICD-10-CM

## 2022-12-01 PROCEDURE — 45385 COLONOSCOPY W/LESION REMOVAL: CPT | Performed by: INTERNAL MEDICINE

## 2022-12-01 PROCEDURE — 88305 TISSUE EXAM BY PATHOLOGIST: CPT | Performed by: PATHOLOGY

## 2022-12-01 RX ORDER — OMEGA-3-ACID ETHYL ESTERS 1 G/1
CAPSULE, LIQUID FILLED ORAL
Refills: 0 | Status: ACTIVE | COMMUNITY
Start: 2012-05-18

## 2022-12-01 RX ORDER — AMOXICILLIN AND CLAVULANATE POTASSIUM 600; 42.9 MG/5ML; MG/5ML
1 (ONE) FOR SUSPENSION ORAL
Qty: 0 | Refills: 2 | Status: ACTIVE | COMMUNITY
Start: 2018-12-03

## 2022-12-01 RX ORDER — RIFABUTIN 150 MG/1
2 (TWO) CAPSULE ORAL
Qty: 0 | Refills: 2 | Status: ACTIVE | COMMUNITY
Start: 2019-06-26

## 2022-12-01 RX ORDER — PANTOPRAZOLE SODIUM 40 MG/1
1 (ONE) TABLET, DELAYED RELEASE ORAL
Qty: 0 | Refills: 2 | Status: ACTIVE | COMMUNITY
Start: 2018-09-27

## 2022-12-01 RX ORDER — METRONIDAZOLE 500 MG/1
1 (ONE) TABLET ORAL
Qty: 0 | Refills: 2 | Status: ACTIVE | COMMUNITY
Start: 2018-09-27

## 2022-12-01 RX ORDER — BISMUTH SUBSALICYLATE 262 MG/1
2 (TWO) TABLET, CHEWABLE ORAL
Qty: 0 | Refills: 2 | Status: ACTIVE | COMMUNITY
Start: 2018-03-14

## 2022-12-01 RX ORDER — CLONIDINE HYDROCHLORIDE 0.2 MG/1
TABLET ORAL
Refills: 0 | Status: ACTIVE | COMMUNITY
Start: 2012-05-18

## 2022-12-01 RX ORDER — HYDROCORTISONE ACETATE 25 MG/1
INSERT ONE INTO RECTUM QHS PRN SUPPOSITORY RECTAL TAKE AS DIRECTED
Refills: 1 | Status: ACTIVE | COMMUNITY
Start: 2012-06-04

## 2022-12-01 RX ORDER — METFORMIN HCL 500 MG/1
TABLET ORAL
Refills: 0 | Status: ACTIVE | COMMUNITY
Start: 2012-05-18

## 2022-12-01 RX ORDER — WHEAT DEXTRIN 3 G/4 G
1 (ONE) POWDER (GRAM) ORAL DAILY
Qty: 0 | Refills: 2 | Status: ACTIVE | COMMUNITY
Start: 2018-02-28

## 2022-12-01 RX ORDER — PEPPERMINT OIL 90 MG
1-2 CAPSULE, DELAYED, AND EXTENDED RELEASE ORAL
Qty: 0 | Refills: 2 | Status: ACTIVE | COMMUNITY
Start: 2020-06-09

## 2022-12-01 RX ORDER — FAMOTIDINE 10 MG
1 (ONE) TABLET ORAL
Qty: 0 | Refills: 16 | Status: ACTIVE | COMMUNITY
Start: 2018-02-28

## 2022-12-01 RX ORDER — LEVOFLOXACIN 500 MG/1
1 (ONE) TABLET, FILM COATED ORAL DAILY
Qty: 0 | Refills: 2 | Status: ACTIVE | COMMUNITY
Start: 2018-09-17

## 2022-12-01 RX ORDER — HYDROXYZINE HYDROCHLORIDE 10 MG/1
TABLET ORAL
Refills: 0 | Status: ACTIVE | COMMUNITY
Start: 2012-05-18

## 2022-12-01 RX ORDER — METOCLOPRAMIDE HYDROCHLORIDE 5 MG/1
TABLET ORAL
Refills: 0 | Status: ACTIVE | COMMUNITY
Start: 2012-05-18

## 2022-12-01 RX ORDER — METRONIDAZOLE 250 MG/1
1 (ONE) TABLET ORAL
Qty: 0 | Refills: 2 | Status: ACTIVE | COMMUNITY
Start: 2018-03-14

## 2022-12-01 RX ORDER — FAMOTIDINE 40 MG/1
ONE PO QHS TABLET, FILM COATED ORAL
Refills: 2 | Status: ACTIVE | COMMUNITY
Start: 2013-01-23

## 2022-12-01 RX ORDER — DOXYCYCLINE HYCLATE 100 MG/1
1 (ONE) CAPSULE ORAL
Qty: 0 | Refills: 2 | Status: ACTIVE | COMMUNITY
Start: 2018-03-15

## 2022-12-01 RX ORDER — TRAMADOL HYDROCHLORIDE 50 MG/1
TABLET ORAL
Refills: 0 | Status: ACTIVE | COMMUNITY
Start: 2012-05-18

## 2022-12-01 RX ORDER — SUCRALFATE 1 G/1
1 (ONE) TABLET ORAL
Qty: 0 | Refills: 4 | Status: ACTIVE | COMMUNITY
Start: 2021-06-24

## 2022-12-01 RX ORDER — LABETALOL HYDROCHLORIDE 300 MG/1
TABLET, FILM COATED ORAL
Refills: 0 | Status: ACTIVE | COMMUNITY
Start: 2012-05-18

## 2022-12-01 RX ORDER — OMEPRAZOLE 20 MG/1
TAKE ONE CAPSULE BY MOUTH TWICE DAILY CAPSULE, DELAYED RELEASE ORAL
Refills: 0 | Status: ACTIVE | COMMUNITY
Start: 2014-08-04

## 2022-12-01 RX ORDER — SALSALATE 750 MG/1
TABLET, FILM COATED ORAL
Refills: 0 | Status: ACTIVE | COMMUNITY
Start: 2012-05-18

## 2022-12-01 RX ORDER — FLUTICASONE PROPIONATE AND SALMETEROL 50; 250 UG/1; UG/1
POWDER RESPIRATORY (INHALATION)
Refills: 0 | Status: ACTIVE | COMMUNITY
Start: 2012-05-18

## 2022-12-01 RX ORDER — OMEPRAZOLE 40 MG/1
CAPSULE, DELAYED RELEASE ORAL TWICE A DAY
Refills: 2 | Status: ACTIVE | COMMUNITY
Start: 2012-06-04

## 2022-12-01 RX ORDER — AMOXICILLIN 500 MG/1
2 (TWO) CAPSULE ORAL
Qty: 0 | Refills: 2 | Status: ACTIVE | COMMUNITY
Start: 2018-09-17

## 2022-12-01 RX ORDER — LINACLOTIDE 72 UG/1
1 (ONE) CAPSULE, GELATIN COATED ORAL DAILY
Qty: 0 | Refills: 4 | Status: ACTIVE | COMMUNITY
Start: 2021-04-26

## 2022-12-01 RX ORDER — PANTOPRAZOLE 20 MG/1
1 (ONE) TABLET, DELAYED RELEASE ORAL
Qty: 0 | Refills: 16 | Status: ACTIVE | COMMUNITY
Start: 2020-05-22

## 2022-12-01 RX ORDER — LORATADINE 5 MG
17 GRAMS TABLET,CHEWABLE ORAL
Qty: 0 | Refills: 16 | Status: ACTIVE | COMMUNITY
Start: 2021-04-26

## 2022-12-01 RX ORDER — ALBUTEROL SULFATE 90 UG/1
AEROSOL, METERED RESPIRATORY (INHALATION)
Refills: 0 | Status: ACTIVE | COMMUNITY
Start: 2012-05-18

## 2022-12-01 RX ORDER — PANTOPRAZOLE SODIUM 40 MG/1
1 (ONE) TABLET, DELAYED RELEASE ORAL ONCE A DAY
Qty: 90 | Refills: 2
Start: 2018-09-27

## 2022-12-13 ENCOUNTER — OFFICE VISIT (OUTPATIENT)
Dept: URBAN - METROPOLITAN AREA CLINIC 9 | Facility: CLINIC | Age: 77
End: 2022-12-13

## 2023-01-13 ENCOUNTER — TELEPHONE ENCOUNTER (OUTPATIENT)
Dept: URBAN - METROPOLITAN AREA CLINIC 9 | Facility: CLINIC | Age: 78
End: 2023-01-13

## 2023-01-13 RX ORDER — PANTOPRAZOLE SODIUM 40 MG/1
1 (ONE) TABLET, DELAYED RELEASE ORAL ONCE A DAY
Qty: 90 | Refills: 2
Start: 2018-09-27

## 2023-02-01 ENCOUNTER — WEB ENCOUNTER (OUTPATIENT)
Dept: URBAN - METROPOLITAN AREA CLINIC 9 | Facility: CLINIC | Age: 78
End: 2023-02-01

## 2023-02-01 ENCOUNTER — OFFICE VISIT (OUTPATIENT)
Dept: URBAN - METROPOLITAN AREA CLINIC 9 | Facility: CLINIC | Age: 78
End: 2023-02-01
Payer: COMMERCIAL

## 2023-02-01 VITALS
HEIGHT: 62 IN | SYSTOLIC BLOOD PRESSURE: 142 MMHG | WEIGHT: 213 LBS | DIASTOLIC BLOOD PRESSURE: 84 MMHG | BODY MASS INDEX: 39.2 KG/M2

## 2023-02-01 DIAGNOSIS — K58.2 IRRITABLE BOWEL SYNDROME WITH BOTH CONSTIPATION AND DIARRHEA: ICD-10-CM

## 2023-02-01 DIAGNOSIS — K44.9 HIATAL HERNIA: ICD-10-CM

## 2023-02-01 DIAGNOSIS — K22.70 BARRETT'S ESOPHAGUS WITHOUT DYSPLASIA: ICD-10-CM

## 2023-02-01 DIAGNOSIS — D49.0 IPMN (INTRADUCTAL PAPILLARY MUCINOUS NEOPLASM): ICD-10-CM

## 2023-02-01 DIAGNOSIS — Z90.49 S/P CHOLECYSTECTOMY: ICD-10-CM

## 2023-02-01 DIAGNOSIS — K21.9 GASTROESOPHAGEAL REFLUX DISEASE WITHOUT ESOPHAGITIS: ICD-10-CM

## 2023-02-01 PROCEDURE — 99214 OFFICE O/P EST MOD 30 MIN: CPT | Performed by: INTERNAL MEDICINE

## 2023-02-01 RX ORDER — METRONIDAZOLE 500 MG/1
1 (ONE) TABLET ORAL
Qty: 0 | Refills: 2 | Status: ON HOLD | COMMUNITY
Start: 2018-09-27

## 2023-02-01 RX ORDER — LINACLOTIDE 72 UG/1
1 (ONE) CAPSULE, GELATIN COATED ORAL DAILY
Qty: 0 | Refills: 4 | Status: ACTIVE | COMMUNITY
Start: 2021-04-26

## 2023-02-01 RX ORDER — MONTELUKAST SODIUM 10 MG/1
TAKE 1 TABLET BY MOUTH EVERY DAY AT BEDTIME TABLET, FILM COATED ORAL
Qty: 90 EACH | Refills: 0 | Status: ACTIVE | COMMUNITY

## 2023-02-01 RX ORDER — BISMUTH SUBSALICYLATE 262 MG/1
2 (TWO) TABLET, CHEWABLE ORAL
Qty: 0 | Refills: 2 | Status: ON HOLD | COMMUNITY
Start: 2018-03-14

## 2023-02-01 RX ORDER — SUCRALFATE 1 G/1
1 (ONE) TABLET ORAL
Qty: 90 | Refills: 2 | OUTPATIENT

## 2023-02-01 RX ORDER — LABETALOL HYDROCHLORIDE 300 MG/1
TABLET, FILM COATED ORAL
Refills: 0 | Status: ACTIVE | COMMUNITY
Start: 2012-05-18

## 2023-02-01 RX ORDER — LORATADINE 5 MG
17 GRAMS TABLET,CHEWABLE ORAL
Qty: 0 | Refills: 16 | Status: ACTIVE | COMMUNITY
Start: 2021-04-26

## 2023-02-01 RX ORDER — THEOPHYLLINE 300 MG/1
TAKE 1/2 TABLET BY MOUTH EVERY 12 HOURS TABLET, EXTENDED RELEASE ORAL
Qty: 90 EACH | Refills: 2 | Status: ACTIVE | COMMUNITY

## 2023-02-01 RX ORDER — RIFABUTIN 150 MG/1
2 (TWO) CAPSULE ORAL
Qty: 0 | Refills: 2 | Status: ON HOLD | COMMUNITY
Start: 2019-06-26

## 2023-02-01 RX ORDER — WHEAT DEXTRIN 3 G/4 G
1 (ONE) POWDER (GRAM) ORAL DAILY
Qty: 0 | Refills: 2 | Status: ON HOLD | COMMUNITY
Start: 2018-02-28

## 2023-02-01 RX ORDER — OMEGA-3-ACID ETHYL ESTERS 1 G/1
CAPSULE, LIQUID FILLED ORAL
Refills: 0 | Status: ON HOLD | COMMUNITY
Start: 2012-05-18

## 2023-02-01 RX ORDER — DOXYCYCLINE HYCLATE 100 MG/1
1 (ONE) CAPSULE ORAL
Qty: 0 | Refills: 2 | Status: ON HOLD | COMMUNITY
Start: 2018-03-15

## 2023-02-01 RX ORDER — FAMOTIDINE 10 MG
1 (ONE) TABLET ORAL
Qty: 0 | Refills: 16 | Status: ON HOLD | COMMUNITY
Start: 2018-02-28

## 2023-02-01 RX ORDER — ALBUTEROL SULFATE 90 UG/1
AEROSOL, METERED RESPIRATORY (INHALATION)
Refills: 0 | Status: ACTIVE | COMMUNITY
Start: 2012-05-18

## 2023-02-01 RX ORDER — FAMOTIDINE 40 MG/1
ONE PO QHS TABLET, FILM COATED ORAL
Refills: 2 | Status: ON HOLD | COMMUNITY
Start: 2013-01-23

## 2023-02-01 RX ORDER — PANTOPRAZOLE SODIUM 40 MG/1
1 (ONE) TABLET, DELAYED RELEASE ORAL ONCE A DAY
Qty: 90 | Refills: 2 | Status: ACTIVE | COMMUNITY
Start: 2018-09-27

## 2023-02-01 RX ORDER — WHEAT DEXTRIN 3 G/4 G
1 TABLESPOON POWDER (GRAM) ORAL TWICE A DAY
Qty: 60 | Refills: 3 | OUTPATIENT
Start: 2018-02-28 | End: 2023-06-01

## 2023-02-01 RX ORDER — METFORMIN HCL 500 MG/1
TABLET ORAL
Refills: 0 | Status: ACTIVE | COMMUNITY
Start: 2012-05-18

## 2023-02-01 RX ORDER — SITAGLIPTIN 100 MG/1
TAKE 1 TABLET BY MOUTH EVERY DAY TABLET, FILM COATED ORAL
Qty: 90 EACH | Refills: 1 | Status: ACTIVE | COMMUNITY

## 2023-02-01 RX ORDER — AMOXICILLIN 500 MG/1
2 (TWO) CAPSULE ORAL
Qty: 0 | Refills: 2 | Status: ON HOLD | COMMUNITY
Start: 2018-09-17

## 2023-02-01 RX ORDER — LEVOFLOXACIN 500 MG/1
1 (ONE) TABLET, FILM COATED ORAL DAILY
Qty: 0 | Refills: 2 | Status: ON HOLD | COMMUNITY
Start: 2018-09-17

## 2023-02-01 RX ORDER — DICYCLOMINE HYDROCHLORIDE 10 MG/1
2 CAPSULES CAPSULE ORAL THREE TIMES A DAY
Qty: 180 | Refills: 1 | OUTPATIENT

## 2023-02-01 RX ORDER — OMEPRAZOLE 40 MG/1
CAPSULE, DELAYED RELEASE ORAL TWICE A DAY
Refills: 2 | Status: ON HOLD | COMMUNITY
Start: 2012-06-04

## 2023-02-01 RX ORDER — SALSALATE 750 MG/1
TABLET, FILM COATED ORAL
Refills: 0 | Status: ON HOLD | COMMUNITY
Start: 2012-05-18

## 2023-02-01 RX ORDER — HYDROCORTISONE ACETATE 25 MG/1
_INSERT ONE INTO RECTUM QHS PRN SUPPOSITORY RECTAL TAKE AS DIRECTED
Refills: 1 | Status: ON HOLD | COMMUNITY
Start: 2012-06-04

## 2023-02-01 RX ORDER — LORATADINE 5 MG
17 GRAMS TABLET,CHEWABLE ORAL
OUTPATIENT
Start: 2021-04-26

## 2023-02-01 RX ORDER — OMEPRAZOLE 20 MG/1
TAKE ONE CAPSULE BY MOUTH TWICE DAILY CAPSULE, DELAYED RELEASE ORAL
Refills: 0 | Status: ON HOLD | COMMUNITY
Start: 2014-08-04

## 2023-02-01 RX ORDER — TRAMADOL HYDROCHLORIDE 50 MG/1
TABLET ORAL
Refills: 0 | Status: ON HOLD | COMMUNITY
Start: 2012-05-18

## 2023-02-01 RX ORDER — METOCLOPRAMIDE HYDROCHLORIDE 5 MG/1
TABLET ORAL
Refills: 0 | Status: ON HOLD | COMMUNITY
Start: 2012-05-18

## 2023-02-01 RX ORDER — CLONIDINE HYDROCHLORIDE 0.2 MG/1
TABLET ORAL
Refills: 0 | Status: ACTIVE | COMMUNITY
Start: 2012-05-18

## 2023-02-01 RX ORDER — HYDROCHLOROTHIAZIDE 25 MG/1
TAKE 1 TABLET BY MOUTH DAILY TABLET ORAL
Qty: 90 EACH | Refills: 0 | Status: ACTIVE | COMMUNITY

## 2023-02-01 RX ORDER — HYDROXYZINE HYDROCHLORIDE 10 MG/1
TABLET ORAL
Refills: 0 | Status: ACTIVE | COMMUNITY
Start: 2012-05-18

## 2023-02-01 RX ORDER — FLUTICASONE PROPIONATE AND SALMETEROL 50; 250 UG/1; UG/1
POWDER RESPIRATORY (INHALATION)
Refills: 0 | Status: ACTIVE | COMMUNITY
Start: 2012-05-18

## 2023-02-01 RX ORDER — METRONIDAZOLE 250 MG/1
1 (ONE) TABLET ORAL
Qty: 0 | Refills: 2 | Status: ON HOLD | COMMUNITY
Start: 2018-03-14

## 2023-02-01 RX ORDER — PANTOPRAZOLE SODIUM 40 MG/1
1 (ONE) TABLET, DELAYED RELEASE ORAL ONCE A DAY
Qty: 30 | Refills: 11 | OUTPATIENT

## 2023-02-01 RX ORDER — LINACLOTIDE 72 UG/1
1 (ONE) CAPSULE, GELATIN COATED ORAL DAILY
OUTPATIENT
Start: 2021-04-26

## 2023-02-01 RX ORDER — SUCRALFATE 1 G/1
1 (ONE) TABLET ORAL
Qty: 0 | Refills: 4 | Status: ACTIVE | COMMUNITY
Start: 2021-06-24

## 2023-02-01 RX ORDER — PEPPERMINT OIL 90 MG
1-2 CAPSULE, DELAYED, AND EXTENDED RELEASE ORAL
Qty: 0 | Refills: 2 | Status: ON HOLD | COMMUNITY
Start: 2020-06-09

## 2023-02-01 RX ORDER — PANTOPRAZOLE 20 MG/1
1 (ONE) TABLET, DELAYED RELEASE ORAL
Qty: 0 | Refills: 16 | Status: ON HOLD | COMMUNITY
Start: 2020-05-22

## 2023-02-01 RX ORDER — AMOXICILLIN AND CLAVULANATE POTASSIUM 600; 42.9 MG/5ML; MG/5ML
1 (ONE) FOR SUSPENSION ORAL
Qty: 0 | Refills: 2 | Status: ON HOLD | COMMUNITY
Start: 2018-12-03

## 2023-02-01 NOTE — HPI-TODAY'S VISIT:
Pt here for f/u of pancreatic cyst and also IBS. . 2018 Due to abd pain CT done showing a 11 mm cyst in the body with upstream PD dilation. MRI done showing similar findings. 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 Nichols planning surgical eval post covid Pancreatic resection in1 2/2020 with IPMN with LGD, now followed closely by Dr. Nichols . 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 S/p ccx MIld BEATTY at times . Prior IBS tx with linzess, miralax and benefiber but now with some exacerbationj of sx. She has had recent MRI, labs and colonoscopy with no abnormality. I advised we stop miralax, cont linzess, increase benefiber to BID and also that we plan on bentyl for additional pain control. We will cont protonix and sucralfate and rtc 3 months. .

## 2023-02-03 ENCOUNTER — TELEPHONE ENCOUNTER (OUTPATIENT)
Dept: URBAN - METROPOLITAN AREA CLINIC 9 | Facility: CLINIC | Age: 78
End: 2023-02-03

## 2023-02-03 RX ORDER — LINACLOTIDE 72 UG/1
1 (ONE) CAPSULE, GELATIN COATED ORAL DAILY
Qty: 90 | Refills: 3
Start: 2021-04-26 | End: 2024-01-29

## 2023-02-08 ENCOUNTER — TELEPHONE ENCOUNTER (OUTPATIENT)
Dept: URBAN - METROPOLITAN AREA CLINIC 9 | Facility: CLINIC | Age: 78
End: 2023-02-08

## 2023-04-03 ENCOUNTER — TELEPHONE ENCOUNTER (OUTPATIENT)
Dept: URBAN - METROPOLITAN AREA CLINIC 7 | Facility: CLINIC | Age: 78
End: 2023-04-03

## 2023-04-05 ENCOUNTER — OFFICE VISIT (OUTPATIENT)
Dept: URBAN - METROPOLITAN AREA CLINIC 9 | Facility: CLINIC | Age: 78
End: 2023-04-05
Payer: COMMERCIAL

## 2023-04-05 VITALS
WEIGHT: 194 LBS | DIASTOLIC BLOOD PRESSURE: 96 MMHG | SYSTOLIC BLOOD PRESSURE: 140 MMHG | HEIGHT: 62 IN | BODY MASS INDEX: 35.7 KG/M2

## 2023-04-05 DIAGNOSIS — K58.2 IRRITABLE BOWEL SYNDROME WITH BOTH CONSTIPATION AND DIARRHEA: ICD-10-CM

## 2023-04-05 DIAGNOSIS — K22.70 BARRETT'S ESOPHAGUS WITHOUT DYSPLASIA: ICD-10-CM

## 2023-04-05 DIAGNOSIS — R10.12 ABDOMINAL PAIN, LUQ: ICD-10-CM

## 2023-04-05 DIAGNOSIS — K21.9 GASTROESOPHAGEAL REFLUX DISEASE WITHOUT ESOPHAGITIS: ICD-10-CM

## 2023-04-05 PROCEDURE — 99214 OFFICE O/P EST MOD 30 MIN: CPT | Performed by: INTERNAL MEDICINE

## 2023-04-05 RX ORDER — AMOXICILLIN 500 MG/1
2 (TWO) CAPSULE ORAL
Qty: 0 | Refills: 2 | Status: ON HOLD | COMMUNITY
Start: 2018-09-17

## 2023-04-05 RX ORDER — PANTOPRAZOLE SODIUM 40 MG/1
1 (ONE) TABLET, DELAYED RELEASE ORAL ONCE A DAY
OUTPATIENT

## 2023-04-05 RX ORDER — PEPPERMINT OIL 90 MG
1-2 CAPSULE, DELAYED, AND EXTENDED RELEASE ORAL
Qty: 0 | Refills: 2 | Status: ON HOLD | COMMUNITY
Start: 2020-06-09

## 2023-04-05 RX ORDER — CLONIDINE HYDROCHLORIDE 0.2 MG/1
TABLET ORAL
Refills: 0 | Status: ON HOLD | COMMUNITY
Start: 2012-05-18

## 2023-04-05 RX ORDER — MONTELUKAST SODIUM 10 MG/1
TAKE 1 TABLET BY MOUTH EVERY DAY AT BEDTIME TABLET, FILM COATED ORAL
Qty: 90 EACH | Refills: 0 | Status: ACTIVE | COMMUNITY

## 2023-04-05 RX ORDER — THEOPHYLLINE 300 MG/1
TAKE 1/2 TABLET BY MOUTH EVERY 12 HOURS TABLET, EXTENDED RELEASE ORAL
Qty: 90 EACH | Refills: 2 | Status: ACTIVE | COMMUNITY

## 2023-04-05 RX ORDER — LORATADINE 5 MG
17 GRAMS TABLET,CHEWABLE ORAL
Status: ACTIVE | COMMUNITY
Start: 2021-04-26

## 2023-04-05 RX ORDER — EVOLOCUMAB 140 MG/ML
1 ML INJECTION, SOLUTION SUBCUTANEOUS
Status: ACTIVE | COMMUNITY

## 2023-04-05 RX ORDER — RIFABUTIN 150 MG/1
2 (TWO) CAPSULE ORAL
Qty: 0 | Refills: 2 | Status: ON HOLD | COMMUNITY
Start: 2019-06-26

## 2023-04-05 RX ORDER — HYDROCORTISONE ACETATE 25 MG/1
_INSERT ONE INTO RECTUM QHS PRN SUPPOSITORY RECTAL TAKE AS DIRECTED
Refills: 1 | Status: ON HOLD | COMMUNITY
Start: 2012-06-04

## 2023-04-05 RX ORDER — AMOXICILLIN AND CLAVULANATE POTASSIUM 600; 42.9 MG/5ML; MG/5ML
1 (ONE) FOR SUSPENSION ORAL
Qty: 0 | Refills: 2 | Status: ON HOLD | COMMUNITY
Start: 2018-12-03

## 2023-04-05 RX ORDER — LABETALOL HYDROCHLORIDE 300 MG/1
TABLET, FILM COATED ORAL
Refills: 0 | Status: ON HOLD | COMMUNITY
Start: 2012-05-18

## 2023-04-05 RX ORDER — OMEGA-3-ACID ETHYL ESTERS 1 G/1
CAPSULE, LIQUID FILLED ORAL
Refills: 0 | Status: ON HOLD | COMMUNITY
Start: 2012-05-18

## 2023-04-05 RX ORDER — TRAMADOL HYDROCHLORIDE 50 MG/1
TABLET ORAL
Refills: 0 | Status: ON HOLD | COMMUNITY
Start: 2012-05-18

## 2023-04-05 RX ORDER — SUCRALFATE 1 G/1
1 (ONE) TABLET ORAL
Qty: 90 | Refills: 2 | Status: ACTIVE | COMMUNITY

## 2023-04-05 RX ORDER — HYDROCHLOROTHIAZIDE 25 MG/1
TAKE 1 TABLET BY MOUTH DAILY TABLET ORAL
Qty: 90 EACH | Refills: 0 | Status: ON HOLD | COMMUNITY

## 2023-04-05 RX ORDER — FAMOTIDINE 40 MG/1
ONE PO QHS TABLET, FILM COATED ORAL
Refills: 2 | Status: ON HOLD | COMMUNITY
Start: 2013-01-23

## 2023-04-05 RX ORDER — LINACLOTIDE 145 UG/1
1 (ONE) CAPSULE, GELATIN COATED ORAL DAILY
Qty: 30 | Refills: 3 | OUTPATIENT
Start: 2021-04-26 | End: 2023-08-03

## 2023-04-05 RX ORDER — OMEPRAZOLE 40 MG/1
CAPSULE, DELAYED RELEASE ORAL TWICE A DAY
Refills: 2 | Status: ON HOLD | COMMUNITY
Start: 2012-06-04

## 2023-04-05 RX ORDER — WHEAT DEXTRIN 3 G/4 G
1 TABLESPOON POWDER (GRAM) ORAL TWICE A DAY
Qty: 60 | Refills: 3 | Status: ACTIVE | COMMUNITY
Start: 2018-02-28 | End: 2023-06-01

## 2023-04-05 RX ORDER — HYDROXYZINE HYDROCHLORIDE 10 MG/1
TABLET ORAL
Refills: 0 | Status: ACTIVE | COMMUNITY
Start: 2012-05-18

## 2023-04-05 RX ORDER — DOXYCYCLINE HYCLATE 100 MG/1
1 (ONE) CAPSULE ORAL
Qty: 0 | Refills: 2 | Status: ON HOLD | COMMUNITY
Start: 2018-03-15

## 2023-04-05 RX ORDER — ISOSORBIDE MONONITRATE 30 MG/1
1 TABLET IN THE MORNING TABLET, EXTENDED RELEASE ORAL ONCE A DAY
Status: ACTIVE | COMMUNITY

## 2023-04-05 RX ORDER — DICYCLOMINE HYDROCHLORIDE 10 MG/1
2 CAPSULES CAPSULE ORAL THREE TIMES A DAY
Qty: 180 | Refills: 1 | Status: ACTIVE | COMMUNITY

## 2023-04-05 RX ORDER — METRONIDAZOLE 500 MG/1
1 (ONE) TABLET ORAL
Qty: 0 | Refills: 2 | Status: ON HOLD | COMMUNITY
Start: 2018-09-27

## 2023-04-05 RX ORDER — FAMOTIDINE 10 MG
1 (ONE) TABLET ORAL
Qty: 0 | Refills: 16 | Status: ON HOLD | COMMUNITY
Start: 2018-02-28

## 2023-04-05 RX ORDER — PANTOPRAZOLE 20 MG/1
1 (ONE) TABLET, DELAYED RELEASE ORAL
Qty: 0 | Refills: 16 | Status: ON HOLD | COMMUNITY
Start: 2020-05-22

## 2023-04-05 RX ORDER — LINAGLIPTIN 5 MG/1
1 TABLET TABLET, FILM COATED ORAL ONCE A DAY
Status: ACTIVE | COMMUNITY

## 2023-04-05 RX ORDER — METOCLOPRAMIDE HYDROCHLORIDE 5 MG/1
TABLET ORAL
Refills: 0 | Status: ON HOLD | COMMUNITY
Start: 2012-05-18

## 2023-04-05 RX ORDER — ALBUTEROL SULFATE 90 UG/1
AEROSOL, METERED RESPIRATORY (INHALATION)
Refills: 0 | Status: ACTIVE | COMMUNITY
Start: 2012-05-18

## 2023-04-05 RX ORDER — METFORMIN HCL 500 MG/1
TABLET ORAL
Refills: 0 | Status: ACTIVE | COMMUNITY
Start: 2012-05-18

## 2023-04-05 RX ORDER — SALSALATE 750 MG/1
TABLET, FILM COATED ORAL
Refills: 0 | Status: ON HOLD | COMMUNITY
Start: 2012-05-18

## 2023-04-05 RX ORDER — SITAGLIPTIN 100 MG/1
TAKE 1 TABLET BY MOUTH EVERY DAY TABLET, FILM COATED ORAL
Qty: 90 EACH | Refills: 1 | Status: ON HOLD | COMMUNITY

## 2023-04-05 RX ORDER — GLIPIZIDE 5 MG/1
1 TABLET WITH FOOD TABLET, EXTENDED RELEASE ORAL ONCE A DAY
Status: ACTIVE | COMMUNITY

## 2023-04-05 RX ORDER — BISMUTH SUBSALICYLATE 262 MG/1
2 (TWO) TABLET, CHEWABLE ORAL
Qty: 0 | Refills: 2 | Status: ON HOLD | COMMUNITY
Start: 2018-03-14

## 2023-04-05 RX ORDER — PANTOPRAZOLE SODIUM 40 MG/1
1 (ONE) TABLET, DELAYED RELEASE ORAL ONCE A DAY
Qty: 30 | Refills: 11 | Status: ACTIVE | COMMUNITY

## 2023-04-05 RX ORDER — LINACLOTIDE 72 UG/1
1 (ONE) CAPSULE, GELATIN COATED ORAL DAILY
Qty: 90 | Refills: 3 | Status: ACTIVE | COMMUNITY
Start: 2021-04-26 | End: 2024-01-29

## 2023-04-05 RX ORDER — LEVOFLOXACIN 500 MG/1
1 (ONE) TABLET, FILM COATED ORAL DAILY
Qty: 0 | Refills: 2 | Status: ON HOLD | COMMUNITY
Start: 2018-09-17

## 2023-04-05 RX ORDER — METHOCARBAMOL 750 MG/1
1 TABLET TABLET ORAL
Status: ACTIVE | COMMUNITY

## 2023-04-05 RX ORDER — METRONIDAZOLE 250 MG/1
1 (ONE) TABLET ORAL
Qty: 0 | Refills: 2 | Status: ON HOLD | COMMUNITY
Start: 2018-03-14

## 2023-04-05 RX ORDER — FLUTICASONE PROPIONATE AND SALMETEROL 50; 250 UG/1; UG/1
POWDER RESPIRATORY (INHALATION)
Refills: 0 | Status: ACTIVE | COMMUNITY
Start: 2012-05-18

## 2023-04-05 RX ORDER — WHEAT DEXTRIN 3 G/4 G
1 TABLESPOON POWDER (GRAM) ORAL TWICE A DAY
OUTPATIENT
Start: 2018-02-28

## 2023-04-05 RX ORDER — OMEPRAZOLE 20 MG/1
TAKE ONE CAPSULE BY MOUTH TWICE DAILY CAPSULE, DELAYED RELEASE ORAL
Refills: 0 | Status: ON HOLD | COMMUNITY
Start: 2014-08-04

## 2023-04-05 NOTE — HPI-TODAY'S VISIT:
Pt here for f/u of pancreatic cyst and also IBS. . 2018 Due to abd pain CT done showing a 11 mm cyst in the body with upstream PD dilation. MRI done showing similar findings. 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 Nichols planning surgical eval post covid Pancreatic resection in1 2/2020 with IPMN with LGD, now followed closely by Dr. Nichols . 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 S/p ccx MIld BEATTY at times . Pt with severe onset LUQ abd pain in 3/2023 that hasn't fully improved. Pt went to the ED and CT a/p and CBC, mp, lfts were negative. She is on protonix daily and I advsied we cont and plan on EGD with bx for HP. She is agreeable. Otherwise she is having daily Bms on linzess and benefiber and doing well from that perspective. I will also increase her linzess from 72 to 145 as she has diarrhea 3x per week and I wonder about overfow diarrhea.  .

## 2023-04-12 ENCOUNTER — OFFICE VISIT (OUTPATIENT)
Dept: URBAN - METROPOLITAN AREA SURGERY CENTER 9 | Facility: SURGERY CENTER | Age: 78
End: 2023-04-12
Payer: COMMERCIAL

## 2023-04-12 ENCOUNTER — CLAIMS CREATED FROM THE CLAIM WINDOW (OUTPATIENT)
Dept: URBAN - METROPOLITAN AREA CLINIC 4 | Facility: CLINIC | Age: 78
End: 2023-04-12
Payer: COMMERCIAL

## 2023-04-12 DIAGNOSIS — K22.89 DILATATION OF ESOPHAGUS: ICD-10-CM

## 2023-04-12 DIAGNOSIS — K31.7 BENIGN GASTRIC POLYP: ICD-10-CM

## 2023-04-12 DIAGNOSIS — K31.89 OTHER DISEASES OF STOMACH AND DUODENUM: ICD-10-CM

## 2023-04-12 DIAGNOSIS — R10.12 ABDOMINAL BURNING SENSATION IN LEFT UPPER QUADRANT: ICD-10-CM

## 2023-04-12 DIAGNOSIS — K29.70 GASTRITIS, UNSPECIFIED, WITHOUT BLEEDING: ICD-10-CM

## 2023-04-12 PROCEDURE — 88305 TISSUE EXAM BY PATHOLOGIST: CPT | Performed by: PATHOLOGY

## 2023-04-12 PROCEDURE — 43239 EGD BIOPSY SINGLE/MULTIPLE: CPT | Performed by: INTERNAL MEDICINE

## 2023-04-12 PROCEDURE — 88312 SPECIAL STAINS GROUP 1: CPT | Performed by: PATHOLOGY

## 2023-04-12 PROCEDURE — 88341 IMHCHEM/IMCYTCHM EA ADD ANTB: CPT | Performed by: PATHOLOGY

## 2023-04-12 PROCEDURE — 88342 IMHCHEM/IMCYTCHM 1ST ANTB: CPT | Performed by: PATHOLOGY

## 2023-04-12 RX ORDER — GLIPIZIDE 5 MG/1
1 TABLET WITH FOOD TABLET, EXTENDED RELEASE ORAL ONCE A DAY
Status: ACTIVE | COMMUNITY

## 2023-04-12 RX ORDER — SALSALATE 750 MG/1
TABLET, FILM COATED ORAL
Refills: 0 | Status: ON HOLD | COMMUNITY
Start: 2012-05-18

## 2023-04-12 RX ORDER — LORATADINE 5 MG
17 GRAMS TABLET,CHEWABLE ORAL
Status: ACTIVE | COMMUNITY
Start: 2021-04-26

## 2023-04-12 RX ORDER — AMOXICILLIN AND CLAVULANATE POTASSIUM 600; 42.9 MG/5ML; MG/5ML
1 (ONE) FOR SUSPENSION ORAL
Qty: 0 | Refills: 2 | Status: ON HOLD | COMMUNITY
Start: 2018-12-03

## 2023-04-12 RX ORDER — MONTELUKAST SODIUM 10 MG/1
TAKE 1 TABLET BY MOUTH EVERY DAY AT BEDTIME TABLET, FILM COATED ORAL
Qty: 90 EACH | Refills: 0 | Status: ACTIVE | COMMUNITY

## 2023-04-12 RX ORDER — HYDROCORTISONE ACETATE 25 MG/1
_INSERT ONE INTO RECTUM QHS PRN SUPPOSITORY RECTAL TAKE AS DIRECTED
Refills: 1 | Status: ON HOLD | COMMUNITY
Start: 2012-06-04

## 2023-04-12 RX ORDER — OMEPRAZOLE 40 MG/1
CAPSULE, DELAYED RELEASE ORAL TWICE A DAY
Refills: 2 | Status: ON HOLD | COMMUNITY
Start: 2012-06-04

## 2023-04-12 RX ORDER — METRONIDAZOLE 500 MG/1
1 (ONE) TABLET ORAL
Qty: 0 | Refills: 2 | Status: ON HOLD | COMMUNITY
Start: 2018-09-27

## 2023-04-12 RX ORDER — LEVOFLOXACIN 500 MG/1
1 (ONE) TABLET, FILM COATED ORAL DAILY
Qty: 0 | Refills: 2 | Status: ON HOLD | COMMUNITY
Start: 2018-09-17

## 2023-04-12 RX ORDER — LINACLOTIDE 145 UG/1
1 (ONE) CAPSULE, GELATIN COATED ORAL DAILY
Qty: 30 | Refills: 3 | Status: ACTIVE | COMMUNITY
Start: 2021-04-26 | End: 2023-08-03

## 2023-04-12 RX ORDER — DICYCLOMINE HYDROCHLORIDE 10 MG/1
2 CAPSULES CAPSULE ORAL THREE TIMES A DAY
Qty: 180 | Refills: 1 | Status: ACTIVE | COMMUNITY

## 2023-04-12 RX ORDER — SUCRALFATE 1 G/1
1 (ONE) TABLET ORAL
Qty: 90 | Refills: 2 | Status: ACTIVE | COMMUNITY

## 2023-04-12 RX ORDER — DOXYCYCLINE HYCLATE 100 MG/1
1 (ONE) CAPSULE ORAL
Qty: 0 | Refills: 2 | Status: ON HOLD | COMMUNITY
Start: 2018-03-15

## 2023-04-12 RX ORDER — METFORMIN HCL 500 MG/1
TABLET ORAL
Refills: 0 | Status: ACTIVE | COMMUNITY
Start: 2012-05-18

## 2023-04-12 RX ORDER — EVOLOCUMAB 140 MG/ML
1 ML INJECTION, SOLUTION SUBCUTANEOUS
Status: ACTIVE | COMMUNITY

## 2023-04-12 RX ORDER — PANTOPRAZOLE 20 MG/1
1 (ONE) TABLET, DELAYED RELEASE ORAL
Qty: 0 | Refills: 16 | Status: ON HOLD | COMMUNITY
Start: 2020-05-22

## 2023-04-12 RX ORDER — FAMOTIDINE 10 MG
1 (ONE) TABLET ORAL
Qty: 0 | Refills: 16 | Status: ON HOLD | COMMUNITY
Start: 2018-02-28

## 2023-04-12 RX ORDER — THEOPHYLLINE 300 MG/1
TAKE 1/2 TABLET BY MOUTH EVERY 12 HOURS TABLET, EXTENDED RELEASE ORAL
Qty: 90 EACH | Refills: 2 | Status: ACTIVE | COMMUNITY

## 2023-04-12 RX ORDER — TRAMADOL HYDROCHLORIDE 50 MG/1
TABLET ORAL
Refills: 0 | Status: ON HOLD | COMMUNITY
Start: 2012-05-18

## 2023-04-12 RX ORDER — METHOCARBAMOL 750 MG/1
1 TABLET TABLET ORAL
Status: ACTIVE | COMMUNITY

## 2023-04-12 RX ORDER — PEPPERMINT OIL 90 MG
1-2 CAPSULE, DELAYED, AND EXTENDED RELEASE ORAL
Qty: 0 | Refills: 2 | Status: ON HOLD | COMMUNITY
Start: 2020-06-09

## 2023-04-12 RX ORDER — SITAGLIPTIN 100 MG/1
TAKE 1 TABLET BY MOUTH EVERY DAY TABLET, FILM COATED ORAL
Qty: 90 EACH | Refills: 1 | Status: ON HOLD | COMMUNITY

## 2023-04-12 RX ORDER — HYDROCHLOROTHIAZIDE 25 MG/1
TAKE 1 TABLET BY MOUTH DAILY TABLET ORAL
Qty: 90 EACH | Refills: 0 | Status: ON HOLD | COMMUNITY

## 2023-04-12 RX ORDER — METOCLOPRAMIDE HYDROCHLORIDE 5 MG/1
TABLET ORAL
Refills: 0 | Status: ON HOLD | COMMUNITY
Start: 2012-05-18

## 2023-04-12 RX ORDER — ISOSORBIDE MONONITRATE 30 MG/1
1 TABLET IN THE MORNING TABLET, EXTENDED RELEASE ORAL ONCE A DAY
Status: ACTIVE | COMMUNITY

## 2023-04-12 RX ORDER — FLUTICASONE PROPIONATE AND SALMETEROL 50; 250 UG/1; UG/1
POWDER RESPIRATORY (INHALATION)
Refills: 0 | Status: ACTIVE | COMMUNITY
Start: 2012-05-18

## 2023-04-12 RX ORDER — LABETALOL HYDROCHLORIDE 300 MG/1
TABLET, FILM COATED ORAL
Refills: 0 | Status: ON HOLD | COMMUNITY
Start: 2012-05-18

## 2023-04-12 RX ORDER — PANTOPRAZOLE SODIUM 40 MG/1
1 (ONE) TABLET, DELAYED RELEASE ORAL ONCE A DAY
Status: ACTIVE | COMMUNITY

## 2023-04-12 RX ORDER — LINAGLIPTIN 5 MG/1
1 TABLET TABLET, FILM COATED ORAL ONCE A DAY
Status: ACTIVE | COMMUNITY

## 2023-04-12 RX ORDER — CLONIDINE HYDROCHLORIDE 0.2 MG/1
TABLET ORAL
Refills: 0 | Status: ON HOLD | COMMUNITY
Start: 2012-05-18

## 2023-04-12 RX ORDER — METRONIDAZOLE 250 MG/1
1 (ONE) TABLET ORAL
Qty: 0 | Refills: 2 | Status: ON HOLD | COMMUNITY
Start: 2018-03-14

## 2023-04-12 RX ORDER — WHEAT DEXTRIN 3 G/4 G
1 TABLESPOON POWDER (GRAM) ORAL TWICE A DAY
Status: ACTIVE | COMMUNITY
Start: 2018-02-28

## 2023-04-12 RX ORDER — AMOXICILLIN 500 MG/1
2 (TWO) CAPSULE ORAL
Qty: 0 | Refills: 2 | Status: ON HOLD | COMMUNITY
Start: 2018-09-17

## 2023-04-12 RX ORDER — RIFABUTIN 150 MG/1
2 (TWO) CAPSULE ORAL
Qty: 0 | Refills: 2 | Status: ON HOLD | COMMUNITY
Start: 2019-06-26

## 2023-04-12 RX ORDER — OMEGA-3-ACID ETHYL ESTERS 1 G/1
CAPSULE, LIQUID FILLED ORAL
Refills: 0 | Status: ON HOLD | COMMUNITY
Start: 2012-05-18

## 2023-04-12 RX ORDER — BISMUTH SUBSALICYLATE 262 MG/1
2 (TWO) TABLET, CHEWABLE ORAL
Qty: 0 | Refills: 2 | Status: ON HOLD | COMMUNITY
Start: 2018-03-14

## 2023-04-12 RX ORDER — FAMOTIDINE 40 MG/1
ONE PO QHS TABLET, FILM COATED ORAL
Refills: 2 | Status: ON HOLD | COMMUNITY
Start: 2013-01-23

## 2023-04-12 RX ORDER — HYDROXYZINE HYDROCHLORIDE 10 MG/1
TABLET ORAL
Refills: 0 | Status: ACTIVE | COMMUNITY
Start: 2012-05-18

## 2023-04-12 RX ORDER — ALBUTEROL SULFATE 90 UG/1
AEROSOL, METERED RESPIRATORY (INHALATION)
Refills: 0 | Status: ACTIVE | COMMUNITY
Start: 2012-05-18

## 2023-04-12 RX ORDER — OMEPRAZOLE 20 MG/1
TAKE ONE CAPSULE BY MOUTH TWICE DAILY CAPSULE, DELAYED RELEASE ORAL
Refills: 0 | Status: ON HOLD | COMMUNITY
Start: 2014-08-04

## 2023-06-21 ENCOUNTER — OFFICE VISIT (OUTPATIENT)
Dept: URBAN - METROPOLITAN AREA CLINIC 9 | Facility: CLINIC | Age: 78
End: 2023-06-21

## 2024-04-10 ENCOUNTER — OV EP (OUTPATIENT)
Dept: URBAN - METROPOLITAN AREA CLINIC 9 | Facility: CLINIC | Age: 79
End: 2024-04-10
Payer: COMMERCIAL

## 2024-04-10 VITALS
WEIGHT: 173 LBS | DIASTOLIC BLOOD PRESSURE: 90 MMHG | BODY MASS INDEX: 31.83 KG/M2 | HEIGHT: 62 IN | SYSTOLIC BLOOD PRESSURE: 178 MMHG

## 2024-04-10 DIAGNOSIS — K58.2 IRRITABLE BOWEL SYNDROME WITH BOTH CONSTIPATION AND DIARRHEA: ICD-10-CM

## 2024-04-10 DIAGNOSIS — K22.70 BARRETT'S ESOPHAGUS WITHOUT DYSPLASIA: ICD-10-CM

## 2024-04-10 DIAGNOSIS — R13.10 DYSPHAGIA, UNSPECIFIED TYPE: ICD-10-CM

## 2024-04-10 PROCEDURE — 99214 OFFICE O/P EST MOD 30 MIN: CPT | Performed by: INTERNAL MEDICINE

## 2024-04-10 RX ORDER — METRONIDAZOLE 500 MG/1
1 (ONE) TABLET ORAL
Qty: 0 | Refills: 2 | Status: ON HOLD | COMMUNITY
Start: 2018-09-27

## 2024-04-10 RX ORDER — THEOPHYLLINE 300 MG/1
TAKE 1/2 TABLET BY MOUTH EVERY 12 HOURS TABLET, EXTENDED RELEASE ORAL
Qty: 90 EACH | Refills: 2 | Status: ACTIVE | COMMUNITY

## 2024-04-10 RX ORDER — FLUTICASONE PROPIONATE AND SALMETEROL 50; 250 UG/1; UG/1
1 PUFF POWDER RESPIRATORY (INHALATION) TWICE A DAY
Status: ACTIVE | COMMUNITY

## 2024-04-10 RX ORDER — CLONIDINE HYDROCHLORIDE 0.2 MG/1
TABLET ORAL
Refills: 0 | Status: ON HOLD | COMMUNITY
Start: 2012-05-18

## 2024-04-10 RX ORDER — LEVOFLOXACIN 500 MG/1
1 (ONE) TABLET, FILM COATED ORAL DAILY
Qty: 0 | Refills: 2 | Status: ON HOLD | COMMUNITY
Start: 2018-09-17

## 2024-04-10 RX ORDER — LINACLOTIDE 145 UG/1
1 CAPSULE AT LEAST 30 MINUTES BEFORE THE FIRST MEAL OF THE DAY ON AN EMPTY STOMACH CAPSULE, GELATIN COATED ORAL ONCE A DAY
Status: ACTIVE | COMMUNITY

## 2024-04-10 RX ORDER — LINAGLIPTIN 5 MG/1
1 TABLET TABLET, FILM COATED ORAL ONCE A DAY
Status: ACTIVE | COMMUNITY

## 2024-04-10 RX ORDER — BISMUTH SUBSALICYLATE 262 MG/1
2 (TWO) TABLET, CHEWABLE ORAL
Qty: 0 | Refills: 2 | Status: ON HOLD | COMMUNITY
Start: 2018-03-14

## 2024-04-10 RX ORDER — SITAGLIPTIN 100 MG/1
TAKE 1 TABLET BY MOUTH EVERY DAY TABLET, FILM COATED ORAL
Qty: 90 EACH | Refills: 1 | Status: ON HOLD | COMMUNITY

## 2024-04-10 RX ORDER — LORATADINE 5 MG
17 GRAMS TABLET,CHEWABLE ORAL
OUTPATIENT
Start: 2021-04-26

## 2024-04-10 RX ORDER — SALSALATE 750 MG/1
TABLET, FILM COATED ORAL
Refills: 0 | Status: ON HOLD | COMMUNITY
Start: 2012-05-18

## 2024-04-10 RX ORDER — METOCLOPRAMIDE HYDROCHLORIDE 5 MG/1
TABLET ORAL
Refills: 0 | Status: ON HOLD | COMMUNITY
Start: 2012-05-18

## 2024-04-10 RX ORDER — WHEAT DEXTRIN 3 G/4 G
1 TABLESPOON POWDER (GRAM) ORAL TWICE A DAY
Status: ACTIVE | COMMUNITY
Start: 2018-02-28

## 2024-04-10 RX ORDER — TRAMADOL HYDROCHLORIDE 50 MG/1
TABLET ORAL
Refills: 0 | Status: ON HOLD | COMMUNITY
Start: 2012-05-18

## 2024-04-10 RX ORDER — AMOXICILLIN AND CLAVULANATE POTASSIUM 600; 42.9 MG/5ML; MG/5ML
1 (ONE) FOR SUSPENSION ORAL
Qty: 0 | Refills: 2 | Status: ON HOLD | COMMUNITY
Start: 2018-12-03

## 2024-04-10 RX ORDER — MONTELUKAST SODIUM 10 MG/1
TAKE 1 TABLET BY MOUTH EVERY DAY AT BEDTIME TABLET, FILM COATED ORAL
Qty: 90 EACH | Refills: 0 | Status: ACTIVE | COMMUNITY

## 2024-04-10 RX ORDER — OMEPRAZOLE 20 MG/1
TAKE ONE CAPSULE BY MOUTH TWICE DAILY CAPSULE, DELAYED RELEASE ORAL
Refills: 0 | Status: ON HOLD | COMMUNITY
Start: 2014-08-04

## 2024-04-10 RX ORDER — METFORMIN HCL 500 MG/1
1 TABLET WITH A MEAL TABLET ORAL TWICE A DAY
Refills: 0 | Status: ACTIVE | COMMUNITY
Start: 2012-05-18

## 2024-04-10 RX ORDER — METRONIDAZOLE 250 MG/1
1 (ONE) TABLET ORAL
Qty: 0 | Refills: 2 | Status: ON HOLD | COMMUNITY
Start: 2018-03-14

## 2024-04-10 RX ORDER — PANTOPRAZOLE SODIUM 40 MG/1
1 (ONE) TABLET, DELAYED RELEASE ORAL ONCE A DAY
Status: ACTIVE | COMMUNITY

## 2024-04-10 RX ORDER — AMOXICILLIN 500 MG/1
2 (TWO) CAPSULE ORAL
Qty: 0 | Refills: 2 | Status: ON HOLD | COMMUNITY
Start: 2018-09-17

## 2024-04-10 RX ORDER — RIFABUTIN 150 MG/1
2 (TWO) CAPSULE ORAL
Qty: 0 | Refills: 2 | Status: ON HOLD | COMMUNITY
Start: 2019-06-26

## 2024-04-10 RX ORDER — DICYCLOMINE HYDROCHLORIDE 10 MG/1
2 CAPSULES CAPSULE ORAL THREE TIMES A DAY
Qty: 180 | Refills: 1 | Status: ACTIVE | COMMUNITY

## 2024-04-10 RX ORDER — CARVEDILOL 25 MG/1
1 TABLET WITH FOOD TABLET, FILM COATED ORAL TWICE A DAY
Status: ACTIVE | COMMUNITY

## 2024-04-10 RX ORDER — LABETALOL HYDROCHLORIDE 300 MG/1
TABLET, FILM COATED ORAL
Refills: 0 | Status: ON HOLD | COMMUNITY
Start: 2012-05-18

## 2024-04-10 RX ORDER — ISOSORBIDE MONONITRATE 30 MG/1
1 TABLET IN THE MORNING TABLET, EXTENDED RELEASE ORAL ONCE A DAY
Status: ON HOLD | COMMUNITY

## 2024-04-10 RX ORDER — EVOLOCUMAB 140 MG/ML
1 ML INJECTION, SOLUTION SUBCUTANEOUS
Status: ACTIVE | COMMUNITY

## 2024-04-10 RX ORDER — ALBUTEROL SULFATE 90 UG/1
AEROSOL, METERED RESPIRATORY (INHALATION)
Refills: 0 | Status: ACTIVE | COMMUNITY
Start: 2012-05-18

## 2024-04-10 RX ORDER — HYDRALAZINE HYDROCHLORIDE 25 MG/1
1 TABLET WITH FOOD TABLET, FILM COATED ORAL TWICE A DAY
Status: ACTIVE | COMMUNITY

## 2024-04-10 RX ORDER — FLUTICASONE PROPIONATE AND SALMETEROL 50; 250 UG/1; UG/1
POWDER RESPIRATORY (INHALATION)
Refills: 0 | Status: ACTIVE | COMMUNITY
Start: 2012-05-18

## 2024-04-10 RX ORDER — METHOCARBAMOL 750 MG/1
1 TABLET TABLET ORAL
Status: ACTIVE | COMMUNITY

## 2024-04-10 RX ORDER — OMEGA-3-ACID ETHYL ESTERS 1 G/1
CAPSULE, LIQUID FILLED ORAL
Refills: 0 | Status: ON HOLD | COMMUNITY
Start: 2012-05-18

## 2024-04-10 RX ORDER — SUCRALFATE 1 G/1
1 (ONE) TABLET ORAL
Qty: 90 | Refills: 2 | Status: ON HOLD | COMMUNITY

## 2024-04-10 RX ORDER — PANTOPRAZOLE 20 MG/1
1 (ONE) TABLET, DELAYED RELEASE ORAL
Qty: 0 | Refills: 16 | Status: ON HOLD | COMMUNITY
Start: 2020-05-22

## 2024-04-10 RX ORDER — GLIPIZIDE 5 MG/1
1 TABLET 30 MINUTES BEFORE BREAKFAST TABLET ORAL ONCE A DAY
Status: ACTIVE | COMMUNITY

## 2024-04-10 RX ORDER — HYDROXYZINE HYDROCHLORIDE 10 MG/1
TABLET ORAL
Refills: 0 | Status: ON HOLD | COMMUNITY
Start: 2012-05-18

## 2024-04-10 RX ORDER — FAMOTIDINE 10 MG
1 (ONE) TABLET ORAL
Qty: 0 | Refills: 16 | Status: ON HOLD | COMMUNITY
Start: 2018-02-28

## 2024-04-10 RX ORDER — DOXYCYCLINE HYCLATE 100 MG/1
1 (ONE) CAPSULE ORAL
Qty: 0 | Refills: 2 | Status: ON HOLD | COMMUNITY
Start: 2018-03-15

## 2024-04-10 RX ORDER — CETIRIZINE HYDROCHLORIDE 5 MG/1
1 TABLET TABLET, CHEWABLE ORAL ONCE A DAY
Status: ACTIVE | COMMUNITY

## 2024-04-10 RX ORDER — LORATADINE 5 MG
17 GRAMS TABLET,CHEWABLE ORAL
Status: ACTIVE | COMMUNITY
Start: 2021-04-26

## 2024-04-10 RX ORDER — NIFEDIPINE 60 MG/1
1 TABLET ON AN EMPTY STOMACH TABLET, EXTENDED RELEASE ORAL TWICE A DAY
Status: ACTIVE | COMMUNITY

## 2024-04-10 RX ORDER — PANTOPRAZOLE SODIUM 40 MG/1
1 (ONE) TABLET, DELAYED RELEASE ORAL ONCE A DAY
OUTPATIENT

## 2024-04-10 RX ORDER — HYDROCHLOROTHIAZIDE 25 MG/1
TAKE 1 TABLET BY MOUTH DAILY TABLET ORAL
Qty: 90 EACH | Refills: 0 | Status: ON HOLD | COMMUNITY

## 2024-04-10 RX ORDER — OMEPRAZOLE 40 MG/1
CAPSULE, DELAYED RELEASE ORAL TWICE A DAY
Refills: 2 | Status: ON HOLD | COMMUNITY
Start: 2012-06-04

## 2024-04-10 RX ORDER — HYDROCORTISONE ACETATE 25 MG/1
_INSERT ONE INTO RECTUM QHS PRN SUPPOSITORY RECTAL TAKE AS DIRECTED
Refills: 1 | Status: ON HOLD | COMMUNITY
Start: 2012-06-04

## 2024-04-10 RX ORDER — PEPPERMINT OIL 90 MG
1-2 CAPSULE, DELAYED, AND EXTENDED RELEASE ORAL
Qty: 0 | Refills: 2 | Status: ON HOLD | COMMUNITY
Start: 2020-06-09

## 2024-04-10 RX ORDER — DICYCLOMINE HYDROCHLORIDE 10 MG/1
2 CAPSULES CAPSULE ORAL THREE TIMES A DAY
OUTPATIENT

## 2024-04-10 RX ORDER — FAMOTIDINE 40 MG/1
ONE PO QHS TABLET, FILM COATED ORAL
Refills: 2 | Status: ON HOLD | COMMUNITY
Start: 2013-01-23

## 2024-04-10 NOTE — HPI-TODAY'S VISIT:
Pt here for f/u of pancreatic cyst and also IBS. . 2018 Due to abd pain CT done showing a 11 mm cyst in the body with upstream PD dilation. MRI done showing similar findings. 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 Nichols planning surgical eval post covid Pancreatic resection in1 2/2020 with IPMN with LGD, now followed closely by Dr. Nichols . 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 Morrison, repeat 3 years EGD 1/2022 with HP neg gastritis Colon fall 2022 clear 2023 EGD with no stricture, no HP, no vora's . S/p ccx MIld BEATTY at times . Pt is on linzess 72 and that is working ok. She is on ppi full dose and has good control of gerd but is having worsened dysphagia to solids. Will plan on EGD and dilation due to vora's, cont ppi, cont linzess. I reviewed recent ba swallow and CBC, BMp, lfts. .

## 2024-04-17 ENCOUNTER — EGD W/ DIL (OUTPATIENT)
Dept: URBAN - METROPOLITAN AREA SURGERY CENTER 9 | Facility: SURGERY CENTER | Age: 79
End: 2024-04-17
Payer: COMMERCIAL

## 2024-04-17 DIAGNOSIS — K44.9 DIAPHRAGMATIC HERNIA WITHOUT OBSTRUCTION OR GANGRENE: ICD-10-CM

## 2024-04-17 DIAGNOSIS — K31.7 POLYP OF STOMACH AND DUODENUM: ICD-10-CM

## 2024-04-17 DIAGNOSIS — K22.89 OTHER SPECIFIED DISEASE OF ESOPHAGUS: ICD-10-CM

## 2024-04-17 PROCEDURE — 43248 EGD GUIDE WIRE INSERTION: CPT | Performed by: INTERNAL MEDICINE

## 2024-04-17 PROCEDURE — 43239 EGD BIOPSY SINGLE/MULTIPLE: CPT | Performed by: INTERNAL MEDICINE

## 2024-04-17 RX ORDER — FLUTICASONE PROPIONATE AND SALMETEROL 50; 250 UG/1; UG/1
1 PUFF POWDER RESPIRATORY (INHALATION) TWICE A DAY
Status: ACTIVE | COMMUNITY

## 2024-04-17 RX ORDER — OMEPRAZOLE 20 MG/1
TAKE ONE CAPSULE BY MOUTH TWICE DAILY CAPSULE, DELAYED RELEASE ORAL
Refills: 0 | Status: ON HOLD | COMMUNITY
Start: 2014-08-04

## 2024-04-17 RX ORDER — FAMOTIDINE 40 MG/1
ONE PO QHS TABLET, FILM COATED ORAL
Refills: 2 | Status: ON HOLD | COMMUNITY
Start: 2013-01-23

## 2024-04-17 RX ORDER — HYDROXYZINE HYDROCHLORIDE 10 MG/1
TABLET ORAL
Refills: 0 | Status: ON HOLD | COMMUNITY
Start: 2012-05-18

## 2024-04-17 RX ORDER — METOCLOPRAMIDE HYDROCHLORIDE 5 MG/1
TABLET ORAL
Refills: 0 | Status: ON HOLD | COMMUNITY
Start: 2012-05-18

## 2024-04-17 RX ORDER — HYDRALAZINE HYDROCHLORIDE 25 MG/1
1 TABLET WITH FOOD TABLET, FILM COATED ORAL TWICE A DAY
Status: ACTIVE | COMMUNITY

## 2024-04-17 RX ORDER — AMOXICILLIN AND CLAVULANATE POTASSIUM 600; 42.9 MG/5ML; MG/5ML
1 (ONE) FOR SUSPENSION ORAL
Qty: 0 | Refills: 2 | Status: ON HOLD | COMMUNITY
Start: 2018-12-03

## 2024-04-17 RX ORDER — THEOPHYLLINE 300 MG/1
TAKE 1/2 TABLET BY MOUTH EVERY 12 HOURS TABLET, EXTENDED RELEASE ORAL
Qty: 90 EACH | Refills: 2 | Status: ACTIVE | COMMUNITY

## 2024-04-17 RX ORDER — OMEGA-3-ACID ETHYL ESTERS 1 G/1
CAPSULE, LIQUID FILLED ORAL
Refills: 0 | Status: ON HOLD | COMMUNITY
Start: 2012-05-18

## 2024-04-17 RX ORDER — CARVEDILOL 25 MG/1
1 TABLET WITH FOOD TABLET, FILM COATED ORAL TWICE A DAY
Status: ACTIVE | COMMUNITY

## 2024-04-17 RX ORDER — AMOXICILLIN 500 MG/1
2 (TWO) CAPSULE ORAL
Qty: 0 | Refills: 2 | Status: ON HOLD | COMMUNITY
Start: 2018-09-17

## 2024-04-17 RX ORDER — LINACLOTIDE 145 UG/1
1 CAPSULE AT LEAST 30 MINUTES BEFORE THE FIRST MEAL OF THE DAY ON AN EMPTY STOMACH CAPSULE, GELATIN COATED ORAL ONCE A DAY
Status: ACTIVE | COMMUNITY

## 2024-04-17 RX ORDER — LABETALOL HYDROCHLORIDE 300 MG/1
TABLET, FILM COATED ORAL
Refills: 0 | Status: ON HOLD | COMMUNITY
Start: 2012-05-18

## 2024-04-17 RX ORDER — EVOLOCUMAB 140 MG/ML
1 ML INJECTION, SOLUTION SUBCUTANEOUS
Status: ACTIVE | COMMUNITY

## 2024-04-17 RX ORDER — FAMOTIDINE 10 MG
1 (ONE) TABLET ORAL
Qty: 0 | Refills: 16 | Status: ON HOLD | COMMUNITY
Start: 2018-02-28

## 2024-04-17 RX ORDER — ISOSORBIDE MONONITRATE 30 MG/1
1 TABLET IN THE MORNING TABLET, EXTENDED RELEASE ORAL ONCE A DAY
Status: ON HOLD | COMMUNITY

## 2024-04-17 RX ORDER — METFORMIN HCL 500 MG/1
1 TABLET WITH A MEAL TABLET ORAL TWICE A DAY
Refills: 0 | Status: ACTIVE | COMMUNITY
Start: 2012-05-18

## 2024-04-17 RX ORDER — RIFABUTIN 150 MG/1
2 (TWO) CAPSULE ORAL
Qty: 0 | Refills: 2 | Status: ON HOLD | COMMUNITY
Start: 2019-06-26

## 2024-04-17 RX ORDER — FLUTICASONE PROPIONATE AND SALMETEROL 50; 250 UG/1; UG/1
POWDER RESPIRATORY (INHALATION)
Refills: 0 | Status: ACTIVE | COMMUNITY
Start: 2012-05-18

## 2024-04-17 RX ORDER — LORATADINE 5 MG
17 GRAMS TABLET,CHEWABLE ORAL
Status: ACTIVE | COMMUNITY
Start: 2021-04-26

## 2024-04-17 RX ORDER — HYDROCHLOROTHIAZIDE 25 MG/1
TAKE 1 TABLET BY MOUTH DAILY TABLET ORAL
Qty: 90 EACH | Refills: 0 | Status: ON HOLD | COMMUNITY

## 2024-04-17 RX ORDER — MONTELUKAST SODIUM 10 MG/1
TAKE 1 TABLET BY MOUTH EVERY DAY AT BEDTIME TABLET, FILM COATED ORAL
Qty: 90 EACH | Refills: 0 | Status: ACTIVE | COMMUNITY

## 2024-04-17 RX ORDER — SITAGLIPTIN 100 MG/1
TAKE 1 TABLET BY MOUTH EVERY DAY TABLET, FILM COATED ORAL
Qty: 90 EACH | Refills: 1 | Status: ON HOLD | COMMUNITY

## 2024-04-17 RX ORDER — ALBUTEROL SULFATE 90 UG/1
AEROSOL, METERED RESPIRATORY (INHALATION)
Refills: 0 | Status: ACTIVE | COMMUNITY
Start: 2012-05-18

## 2024-04-17 RX ORDER — DOXYCYCLINE HYCLATE 100 MG/1
1 (ONE) CAPSULE ORAL
Qty: 0 | Refills: 2 | Status: ON HOLD | COMMUNITY
Start: 2018-03-15

## 2024-04-17 RX ORDER — SUCRALFATE 1 G/1
1 (ONE) TABLET ORAL
Qty: 90 | Refills: 2 | Status: ON HOLD | COMMUNITY

## 2024-04-17 RX ORDER — OMEPRAZOLE 40 MG/1
CAPSULE, DELAYED RELEASE ORAL TWICE A DAY
Refills: 2 | Status: ON HOLD | COMMUNITY
Start: 2012-06-04

## 2024-04-17 RX ORDER — METHOCARBAMOL 750 MG/1
1 TABLET TABLET ORAL
Status: ACTIVE | COMMUNITY

## 2024-04-17 RX ORDER — LINAGLIPTIN 5 MG/1
1 TABLET TABLET, FILM COATED ORAL ONCE A DAY
Status: ACTIVE | COMMUNITY

## 2024-04-17 RX ORDER — PEPPERMINT OIL 90 MG
1-2 CAPSULE, DELAYED, AND EXTENDED RELEASE ORAL
Qty: 0 | Refills: 2 | Status: ON HOLD | COMMUNITY
Start: 2020-06-09

## 2024-04-17 RX ORDER — GLIPIZIDE 5 MG/1
1 TABLET 30 MINUTES BEFORE BREAKFAST TABLET ORAL ONCE A DAY
Status: ACTIVE | COMMUNITY

## 2024-04-17 RX ORDER — NIFEDIPINE 60 MG/1
1 TABLET ON AN EMPTY STOMACH TABLET, EXTENDED RELEASE ORAL TWICE A DAY
Status: ACTIVE | COMMUNITY

## 2024-04-17 RX ORDER — CETIRIZINE HYDROCHLORIDE 5 MG/1
1 TABLET TABLET, CHEWABLE ORAL ONCE A DAY
Status: ACTIVE | COMMUNITY

## 2024-04-17 RX ORDER — BISMUTH SUBSALICYLATE 262 MG/1
2 (TWO) TABLET, CHEWABLE ORAL
Qty: 0 | Refills: 2 | Status: ON HOLD | COMMUNITY
Start: 2018-03-14

## 2024-04-17 RX ORDER — HYDROCORTISONE ACETATE 25 MG/1
_INSERT ONE INTO RECTUM QHS PRN SUPPOSITORY RECTAL TAKE AS DIRECTED
Refills: 1 | Status: ON HOLD | COMMUNITY
Start: 2012-06-04

## 2024-04-17 RX ORDER — PANTOPRAZOLE 20 MG/1
1 (ONE) TABLET, DELAYED RELEASE ORAL
Qty: 0 | Refills: 16 | Status: ON HOLD | COMMUNITY
Start: 2020-05-22

## 2024-04-17 RX ORDER — TRAMADOL HYDROCHLORIDE 50 MG/1
TABLET ORAL
Refills: 0 | Status: ON HOLD | COMMUNITY
Start: 2012-05-18

## 2024-04-17 RX ORDER — PANTOPRAZOLE SODIUM 40 MG/1
1 (ONE) TABLET, DELAYED RELEASE ORAL ONCE A DAY
Status: ACTIVE | COMMUNITY

## 2024-04-17 RX ORDER — METRONIDAZOLE 500 MG/1
1 (ONE) TABLET ORAL
Qty: 0 | Refills: 2 | Status: ON HOLD | COMMUNITY
Start: 2018-09-27

## 2024-04-17 RX ORDER — METRONIDAZOLE 250 MG/1
1 (ONE) TABLET ORAL
Qty: 0 | Refills: 2 | Status: ON HOLD | COMMUNITY
Start: 2018-03-14

## 2024-04-17 RX ORDER — LEVOFLOXACIN 500 MG/1
1 (ONE) TABLET, FILM COATED ORAL DAILY
Qty: 0 | Refills: 2 | Status: ON HOLD | COMMUNITY
Start: 2018-09-17

## 2024-04-17 RX ORDER — DICYCLOMINE HYDROCHLORIDE 10 MG/1
2 CAPSULES CAPSULE ORAL THREE TIMES A DAY
Status: ACTIVE | COMMUNITY

## 2024-04-17 RX ORDER — CLONIDINE HYDROCHLORIDE 0.2 MG/1
TABLET ORAL
Refills: 0 | Status: ON HOLD | COMMUNITY
Start: 2012-05-18

## 2024-04-17 RX ORDER — SALSALATE 750 MG/1
TABLET, FILM COATED ORAL
Refills: 0 | Status: ON HOLD | COMMUNITY
Start: 2012-05-18

## 2024-04-17 RX ORDER — WHEAT DEXTRIN 3 G/4 G
1 TABLESPOON POWDER (GRAM) ORAL TWICE A DAY
Status: ACTIVE | COMMUNITY
Start: 2018-02-28

## 2024-09-06 ENCOUNTER — OFFICE VISIT (OUTPATIENT)
Dept: URBAN - METROPOLITAN AREA CLINIC 9 | Facility: CLINIC | Age: 79
End: 2024-09-06
Payer: COMMERCIAL

## 2024-09-06 ENCOUNTER — DASHBOARD ENCOUNTERS (OUTPATIENT)
Age: 79
End: 2024-09-06

## 2024-09-06 VITALS
DIASTOLIC BLOOD PRESSURE: 90 MMHG | HEIGHT: 62 IN | SYSTOLIC BLOOD PRESSURE: 170 MMHG | WEIGHT: 176 LBS | BODY MASS INDEX: 32.39 KG/M2

## 2024-09-06 DIAGNOSIS — K21.9 GASTROESOPHAGEAL REFLUX DISEASE WITHOUT ESOPHAGITIS: ICD-10-CM

## 2024-09-06 DIAGNOSIS — K58.2 IRRITABLE BOWEL SYNDROME WITH BOTH CONSTIPATION AND DIARRHEA: ICD-10-CM

## 2024-09-06 DIAGNOSIS — D49.0 IPMN (INTRADUCTAL PAPILLARY MUCINOUS NEOPLASM): ICD-10-CM

## 2024-09-06 DIAGNOSIS — K22.70 BARRETT'S ESOPHAGUS WITHOUT DYSPLASIA: ICD-10-CM

## 2024-09-06 PROCEDURE — 99214 OFFICE O/P EST MOD 30 MIN: CPT | Performed by: INTERNAL MEDICINE

## 2024-09-06 RX ORDER — TRAMADOL HYDROCHLORIDE 50 MG/1
TABLET ORAL
Refills: 0 | Status: ON HOLD | COMMUNITY
Start: 2012-05-18

## 2024-09-06 RX ORDER — OMEPRAZOLE 20 MG/1
TAKE ONE CAPSULE BY MOUTH TWICE DAILY CAPSULE, DELAYED RELEASE ORAL
Refills: 0 | Status: ON HOLD | COMMUNITY
Start: 2014-08-04

## 2024-09-06 RX ORDER — HYDROXYZINE HYDROCHLORIDE 10 MG/1
TABLET ORAL
Refills: 0 | Status: ON HOLD | COMMUNITY
Start: 2012-05-18

## 2024-09-06 RX ORDER — PANTOPRAZOLE SODIUM 40 MG/1
1 (ONE) TABLET, DELAYED RELEASE ORAL ONCE A DAY
OUTPATIENT

## 2024-09-06 RX ORDER — METRONIDAZOLE 250 MG/1
1 (ONE) TABLET ORAL
Qty: 0 | Refills: 2 | Status: ON HOLD | COMMUNITY
Start: 2018-03-14

## 2024-09-06 RX ORDER — WHEAT DEXTRIN 3 G/4 G
1 TABLESPOON POWDER (GRAM) ORAL TWICE A DAY
OUTPATIENT
Start: 2018-02-28

## 2024-09-06 RX ORDER — DICYCLOMINE HYDROCHLORIDE 10 MG/1
2 CAPSULES CAPSULE ORAL THREE TIMES A DAY
Status: ACTIVE | COMMUNITY

## 2024-09-06 RX ORDER — LABETALOL HYDROCHLORIDE 300 MG/1
TABLET, FILM COATED ORAL
Refills: 0 | Status: ON HOLD | COMMUNITY
Start: 2012-05-18

## 2024-09-06 RX ORDER — RIFABUTIN 150 MG/1
2 (TWO) CAPSULE ORAL
Qty: 0 | Refills: 2 | Status: ON HOLD | COMMUNITY
Start: 2019-06-26

## 2024-09-06 RX ORDER — WHEAT DEXTRIN 3 G/4 G
1 TABLESPOON POWDER (GRAM) ORAL TWICE A DAY
Status: ACTIVE | COMMUNITY
Start: 2018-02-28

## 2024-09-06 RX ORDER — PANTOPRAZOLE SODIUM 40 MG/1
1 (ONE) TABLET, DELAYED RELEASE ORAL ONCE A DAY
Status: ACTIVE | COMMUNITY

## 2024-09-06 RX ORDER — DICYCLOMINE HYDROCHLORIDE 10 MG/1
1 CAPSULES CAPSULE ORAL
Qty: 90 | Refills: 3 | OUTPATIENT

## 2024-09-06 RX ORDER — FAMOTIDINE 10 MG
1 (ONE) TABLET ORAL
Qty: 0 | Refills: 16 | Status: ON HOLD | COMMUNITY
Start: 2018-02-28

## 2024-09-06 RX ORDER — LEVOFLOXACIN 500 MG/1
1 (ONE) TABLET, FILM COATED ORAL DAILY
Qty: 0 | Refills: 2 | Status: ON HOLD | COMMUNITY
Start: 2018-09-17

## 2024-09-06 RX ORDER — GLIPIZIDE 5 MG/1
1 TABLET 30 MINUTES BEFORE BREAKFAST TABLET ORAL ONCE A DAY
Status: DISCONTINUED | COMMUNITY

## 2024-09-06 RX ORDER — BISMUTH SUBSALICYLATE 262 MG/1
2 (TWO) TABLET, CHEWABLE ORAL
Qty: 0 | Refills: 2 | Status: ON HOLD | COMMUNITY
Start: 2018-03-14

## 2024-09-06 RX ORDER — CLONIDINE HYDROCHLORIDE 0.2 MG/1
TABLET ORAL
Refills: 0 | Status: ON HOLD | COMMUNITY
Start: 2012-05-18

## 2024-09-06 RX ORDER — PEPPERMINT OIL 90 MG
1-2 CAPSULE, DELAYED, AND EXTENDED RELEASE ORAL
Qty: 0 | Refills: 2 | Status: ON HOLD | COMMUNITY
Start: 2020-06-09

## 2024-09-06 RX ORDER — OMEPRAZOLE 40 MG/1
CAPSULE, DELAYED RELEASE ORAL TWICE A DAY
Refills: 2 | Status: ON HOLD | COMMUNITY
Start: 2012-06-04

## 2024-09-06 RX ORDER — ALUMINUM HYDROXIDE AND MAGNESIUM CARBONATE 95; 358 MG/15ML; MG/15ML
15 ML AFTER MEALS AND AT BEDTIME AS NEEDED LIQUID ORAL
OUTPATIENT
Start: 2024-09-06

## 2024-09-06 RX ORDER — CETIRIZINE HYDROCHLORIDE 5 MG/1
1 TABLET TABLET, CHEWABLE ORAL ONCE A DAY
Status: ACTIVE | COMMUNITY

## 2024-09-06 RX ORDER — DOXYCYCLINE HYCLATE 100 MG/1
1 (ONE) CAPSULE ORAL
Qty: 0 | Refills: 2 | Status: ON HOLD | COMMUNITY
Start: 2018-03-15

## 2024-09-06 RX ORDER — MONTELUKAST SODIUM 10 MG/1
TAKE 1 TABLET BY MOUTH EVERY DAY AT BEDTIME TABLET, FILM COATED ORAL
Qty: 90 EACH | Refills: 0 | Status: ACTIVE | COMMUNITY

## 2024-09-06 RX ORDER — OMEGA-3-ACID ETHYL ESTERS 1 G/1
CAPSULE, LIQUID FILLED ORAL
Refills: 0 | Status: ON HOLD | COMMUNITY
Start: 2012-05-18

## 2024-09-06 RX ORDER — PANTOPRAZOLE 20 MG/1
1 (ONE) TABLET, DELAYED RELEASE ORAL
Qty: 0 | Refills: 16 | Status: ON HOLD | COMMUNITY
Start: 2020-05-22

## 2024-09-06 RX ORDER — ALBUTEROL SULFATE 90 UG/1
AEROSOL, METERED RESPIRATORY (INHALATION)
Refills: 0 | Status: ACTIVE | COMMUNITY
Start: 2012-05-18

## 2024-09-06 RX ORDER — HYDROCORTISONE ACETATE 25 MG/1
_INSERT ONE INTO RECTUM QHS PRN SUPPOSITORY RECTAL TAKE AS DIRECTED
Refills: 1 | Status: ON HOLD | COMMUNITY
Start: 2012-06-04

## 2024-09-06 RX ORDER — SALSALATE 750 MG/1
TABLET, FILM COATED ORAL
Refills: 0 | Status: ON HOLD | COMMUNITY
Start: 2012-05-18

## 2024-09-06 RX ORDER — LINACLOTIDE 145 UG/1
1 CAPSULE AT LEAST 30 MINUTES BEFORE THE FIRST MEAL OF THE DAY ON AN EMPTY STOMACH CAPSULE, GELATIN COATED ORAL ONCE A DAY
Status: ACTIVE | COMMUNITY

## 2024-09-06 RX ORDER — ISOSORBIDE MONONITRATE 30 MG/1
1 TABLET IN THE MORNING TABLET, EXTENDED RELEASE ORAL ONCE A DAY
Status: ON HOLD | COMMUNITY

## 2024-09-06 RX ORDER — METOCLOPRAMIDE HYDROCHLORIDE 5 MG/1
TABLET ORAL
Refills: 0 | Status: ON HOLD | COMMUNITY
Start: 2012-05-18

## 2024-09-06 RX ORDER — FAMOTIDINE 40 MG/1
ONE PO QHS TABLET, FILM COATED ORAL
Refills: 2 | Status: ON HOLD | COMMUNITY
Start: 2013-01-23

## 2024-09-06 RX ORDER — BENZONATATE 100 MG/1
1 CAPSULE AS NEEDED CAPSULE ORAL TWICE A DAY
Status: ACTIVE | COMMUNITY

## 2024-09-06 RX ORDER — METHOCARBAMOL 750 MG/1
1 TABLET TABLET ORAL
Status: ACTIVE | COMMUNITY

## 2024-09-06 RX ORDER — EVOLOCUMAB 140 MG/ML
1 ML INJECTION, SOLUTION SUBCUTANEOUS
Status: ACTIVE | COMMUNITY

## 2024-09-06 RX ORDER — HYDROCHLOROTHIAZIDE 25 MG/1
TAKE 1 TABLET BY MOUTH DAILY TABLET ORAL
Qty: 90 EACH | Refills: 0 | Status: ON HOLD | COMMUNITY

## 2024-09-06 RX ORDER — HYDRALAZINE HYDROCHLORIDE 25 MG/1
1 TABLET WITH FOOD TABLET, FILM COATED ORAL TWICE A DAY
Status: ACTIVE | COMMUNITY

## 2024-09-06 RX ORDER — FLUTICASONE PROPIONATE AND SALMETEROL 50; 250 UG/1; UG/1
1 PUFF POWDER RESPIRATORY (INHALATION) TWICE A DAY
Status: ACTIVE | COMMUNITY

## 2024-09-06 RX ORDER — SUCRALFATE 1 G/1
1 (ONE) TABLET ORAL
Qty: 90 | Refills: 2 | Status: ON HOLD | COMMUNITY

## 2024-09-06 RX ORDER — METRONIDAZOLE 500 MG/1
1 (ONE) TABLET ORAL
Qty: 0 | Refills: 2 | Status: ON HOLD | COMMUNITY
Start: 2018-09-27

## 2024-09-06 RX ORDER — AMOXICILLIN AND CLAVULANATE POTASSIUM 600; 42.9 MG/5ML; MG/5ML
1 (ONE) FOR SUSPENSION ORAL
Qty: 0 | Refills: 2 | Status: ON HOLD | COMMUNITY
Start: 2018-12-03

## 2024-09-06 RX ORDER — THEOPHYLLINE 300 MG/1
TAKE 1/2 TABLET BY MOUTH EVERY 12 HOURS TABLET, EXTENDED RELEASE ORAL
Qty: 90 EACH | Refills: 2 | Status: ACTIVE | COMMUNITY

## 2024-09-06 RX ORDER — SITAGLIPTIN 100 MG/1
TAKE 1 TABLET BY MOUTH EVERY DAY TABLET, FILM COATED ORAL
Qty: 90 EACH | Refills: 1 | Status: ON HOLD | COMMUNITY

## 2024-09-06 RX ORDER — LORATADINE 5 MG
17 GRAMS TABLET,CHEWABLE ORAL
Status: ACTIVE | COMMUNITY
Start: 2021-04-26

## 2024-09-06 RX ORDER — METFORMIN HCL 500 MG/1
1 TABLET WITH A MEAL TABLET ORAL TWICE A DAY
Refills: 0 | Status: ACTIVE | COMMUNITY
Start: 2012-05-18

## 2024-09-06 RX ORDER — LORATADINE 5 MG
17 GRAMS TABLET,CHEWABLE ORAL
OUTPATIENT
Start: 2021-04-26

## 2024-09-06 RX ORDER — CARVEDILOL 25 MG/1
1 TABLET WITH FOOD TABLET, FILM COATED ORAL TWICE A DAY
Status: ACTIVE | COMMUNITY

## 2024-09-06 RX ORDER — FLUTICASONE PROPIONATE AND SALMETEROL 50; 250 UG/1; UG/1
POWDER RESPIRATORY (INHALATION)
Refills: 0 | Status: ACTIVE | COMMUNITY
Start: 2012-05-18

## 2024-09-06 RX ORDER — AMOXICILLIN 500 MG/1
2 (TWO) CAPSULE ORAL
Qty: 0 | Refills: 2 | Status: ON HOLD | COMMUNITY
Start: 2018-09-17

## 2024-09-06 RX ORDER — NIFEDIPINE 60 MG/1
1 TABLET ON AN EMPTY STOMACH TABLET, EXTENDED RELEASE ORAL TWICE A DAY
Status: ACTIVE | COMMUNITY

## 2024-09-06 RX ORDER — LINAGLIPTIN 5 MG/1
1 TABLET TABLET, FILM COATED ORAL ONCE A DAY
Status: ACTIVE | COMMUNITY

## 2024-09-06 RX ORDER — LINACLOTIDE 145 UG/1
1 CAPSULE AT LEAST 30 MINUTES BEFORE THE FIRST MEAL OF THE DAY ON AN EMPTY STOMACH CAPSULE, GELATIN COATED ORAL ONCE A DAY
OUTPATIENT

## 2024-09-06 NOTE — HPI-TODAY'S VISIT:
Pt here for f/u of pancreatic cyst and also IBS. . 2018 Due to abd pain CT done showing a 11 mm cyst in the body with upstream PD dilation. MRI done showing similar findings. 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 Nichols planning surgical eval post covid Pancreatic resection in1 2/2020 with IPMN with LGD, now followed closely by Dr. Nichols . 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 Morrison, repeat 3 years EGD 1/2022 with HP neg gastritis Colon fall 2022 clear 2023 EGD with no stricture, no HP, no vora's 2024 EGD with small HH, no stricture, no vora's, dilation improved dysphagia . S/p ccx MIld BEATTY at times . Pt here for f/u. She is still having some solid dysphagia. I advised this is likely from her HH and is typically with solid food. i advised about chewing better, using more water when she eats. Will cont ppi and gaviscon as her gerd is controlled and cont linzess.  .

## 2025-02-24 ENCOUNTER — TELEPHONE ENCOUNTER (OUTPATIENT)
Dept: URBAN - METROPOLITAN AREA CLINIC 9 | Facility: CLINIC | Age: 80
End: 2025-02-24

## 2025-04-09 ENCOUNTER — OFFICE VISIT (OUTPATIENT)
Dept: URBAN - METROPOLITAN AREA CLINIC 9 | Facility: CLINIC | Age: 80
End: 2025-04-09
Payer: COMMERCIAL

## 2025-04-09 DIAGNOSIS — K58.2 IRRITABLE BOWEL SYNDROME WITH BOTH CONSTIPATION AND DIARRHEA: ICD-10-CM

## 2025-04-09 DIAGNOSIS — K22.70 BARRETT'S ESOPHAGUS WITHOUT DYSPLASIA: ICD-10-CM

## 2025-04-09 PROCEDURE — 99214 OFFICE O/P EST MOD 30 MIN: CPT | Performed by: INTERNAL MEDICINE

## 2025-04-09 RX ORDER — FAMOTIDINE 40 MG/1
ONE PO QHS TABLET, FILM COATED ORAL
Refills: 2 | Status: ON HOLD | COMMUNITY
Start: 2013-01-23

## 2025-04-09 RX ORDER — LORATADINE 5 MG
17 GRAMS TABLET,CHEWABLE ORAL
Status: ACTIVE | COMMUNITY
Start: 2021-04-26

## 2025-04-09 RX ORDER — SITAGLIPTIN 100 MG/1
TAKE 1 TABLET BY MOUTH EVERY DAY TABLET, FILM COATED ORAL
Qty: 90 EACH | Refills: 1 | Status: ON HOLD | COMMUNITY

## 2025-04-09 RX ORDER — SALSALATE 750 MG/1
TABLET, FILM COATED ORAL
Refills: 0 | Status: ON HOLD | COMMUNITY
Start: 2012-05-18

## 2025-04-09 RX ORDER — PEPPERMINT OIL 90 MG
1-2 CAPSULE, DELAYED, AND EXTENDED RELEASE ORAL
Qty: 0 | Refills: 2 | Status: ON HOLD | COMMUNITY
Start: 2020-06-09

## 2025-04-09 RX ORDER — PANTOPRAZOLE SODIUM 40 MG/1
1 (ONE) TABLET, DELAYED RELEASE ORAL ONCE A DAY
Status: ACTIVE | COMMUNITY

## 2025-04-09 RX ORDER — CETIRIZINE HYDROCHLORIDE 5 MG/1
1 TABLET TABLET, CHEWABLE ORAL ONCE A DAY
Status: ACTIVE | COMMUNITY

## 2025-04-09 RX ORDER — SUCRALFATE 1 G/1
1 (ONE) TABLET ORAL
Qty: 90 | Refills: 2 | Status: ON HOLD | COMMUNITY

## 2025-04-09 RX ORDER — LINACLOTIDE 145 UG/1
1 CAPSULE AT LEAST 30 MINUTES BEFORE THE FIRST MEAL OF THE DAY ON AN EMPTY STOMACH CAPSULE, GELATIN COATED ORAL ONCE A DAY
Status: ACTIVE | COMMUNITY

## 2025-04-09 RX ORDER — PANTOPRAZOLE SODIUM 40 MG/1
1 (ONE) TABLET, DELAYED RELEASE ORAL ONCE A DAY
OUTPATIENT

## 2025-04-09 RX ORDER — AMOXICILLIN AND CLAVULANATE POTASSIUM 600; 42.9 MG/5ML; MG/5ML
1 (ONE) FOR SUSPENSION ORAL
Qty: 0 | Refills: 2 | Status: ON HOLD | COMMUNITY
Start: 2018-12-03

## 2025-04-09 RX ORDER — WHEAT DEXTRIN 3 G/4 G
1 TABLESPOON POWDER (GRAM) ORAL TWICE A DAY
OUTPATIENT
Start: 2018-02-28

## 2025-04-09 RX ORDER — METHOCARBAMOL 750 MG/1
1 TABLET TABLET ORAL
Status: ACTIVE | COMMUNITY

## 2025-04-09 RX ORDER — OMEGA-3-ACID ETHYL ESTERS 1 G/1
CAPSULE ORAL
Refills: 0 | Status: ON HOLD | COMMUNITY
Start: 2012-05-18

## 2025-04-09 RX ORDER — FLUTICASONE PROPIONATE AND SALMETEROL 50; 250 UG/1; UG/1
1 PUFF POWDER RESPIRATORY (INHALATION) TWICE A DAY
Status: ON HOLD | COMMUNITY

## 2025-04-09 RX ORDER — LEVOFLOXACIN 500 MG/1
1 (ONE) TABLET, FILM COATED ORAL DAILY
Qty: 0 | Refills: 2 | Status: ON HOLD | COMMUNITY
Start: 2018-09-17

## 2025-04-09 RX ORDER — OMEPRAZOLE 40 MG/1
CAPSULE, DELAYED RELEASE ORAL TWICE A DAY
Refills: 2 | Status: ON HOLD | COMMUNITY
Start: 2012-06-04

## 2025-04-09 RX ORDER — METFORMIN HCL 500 MG/1
1 TABLET WITH A MEAL TABLET ORAL TWICE A DAY
Refills: 0 | Status: ACTIVE | COMMUNITY
Start: 2012-05-18

## 2025-04-09 RX ORDER — RIFABUTIN 150 MG/1
2 (TWO) CAPSULE ORAL
Qty: 0 | Refills: 2 | Status: ON HOLD | COMMUNITY
Start: 2019-06-26

## 2025-04-09 RX ORDER — HYDRALAZINE HYDROCHLORIDE 25 MG/1
1 TABLET WITH FOOD TABLET, FILM COATED ORAL TWICE A DAY
Status: ACTIVE | COMMUNITY

## 2025-04-09 RX ORDER — HYDROCORTISONE ACETATE 25 MG/1
_INSERT ONE INTO RECTUM QHS PRN SUPPOSITORY RECTAL TAKE AS DIRECTED
Refills: 1 | Status: ON HOLD | COMMUNITY
Start: 2012-06-04

## 2025-04-09 RX ORDER — DICYCLOMINE HYDROCHLORIDE 10 MG/1
1 CAPSULES CAPSULE ORAL
Qty: 90 | Refills: 3 | Status: ACTIVE | COMMUNITY

## 2025-04-09 RX ORDER — LINACLOTIDE 145 UG/1
1 CAPSULE AT LEAST 30 MINUTES BEFORE THE FIRST MEAL OF THE DAY ON AN EMPTY STOMACH CAPSULE, GELATIN COATED ORAL ONCE A DAY
OUTPATIENT

## 2025-04-09 RX ORDER — HYDROCHLOROTHIAZIDE 25 MG/1
TAKE 1 TABLET BY MOUTH DAILY TABLET ORAL
Qty: 90 EACH | Refills: 0 | Status: ON HOLD | COMMUNITY

## 2025-04-09 RX ORDER — CARVEDILOL 25 MG/1
1 TABLET WITH FOOD TABLET, FILM COATED ORAL TWICE A DAY
Status: ACTIVE | COMMUNITY

## 2025-04-09 RX ORDER — DOXYCYCLINE HYCLATE 100 MG/1
1 (ONE) CAPSULE ORAL
Qty: 0 | Refills: 2 | Status: ON HOLD | COMMUNITY
Start: 2018-03-15

## 2025-04-09 RX ORDER — OMEPRAZOLE 20 MG/1
TAKE ONE CAPSULE BY MOUTH TWICE DAILY CAPSULE, DELAYED RELEASE ORAL
Refills: 0 | Status: ON HOLD | COMMUNITY
Start: 2014-08-04

## 2025-04-09 RX ORDER — METRONIDAZOLE 250 MG/1
1 (ONE) TABLET ORAL
Qty: 0 | Refills: 2 | Status: ON HOLD | COMMUNITY
Start: 2018-03-14

## 2025-04-09 RX ORDER — LABETALOL HYDROCHLORIDE 300 MG/1
TABLET, FILM COATED ORAL
Refills: 0 | Status: ON HOLD | COMMUNITY
Start: 2012-05-18

## 2025-04-09 RX ORDER — METOCLOPRAMIDE HYDROCHLORIDE 5 MG/1
TABLET ORAL
Refills: 0 | Status: ON HOLD | COMMUNITY
Start: 2012-05-18

## 2025-04-09 RX ORDER — WHEAT DEXTRIN 3 G/4 G
1 TABLESPOON POWDER (GRAM) ORAL TWICE A DAY
Status: ACTIVE | COMMUNITY
Start: 2018-02-28

## 2025-04-09 RX ORDER — HYDROXYZINE HYDROCHLORIDE 10 MG/1
TABLET ORAL
Refills: 0 | Status: ON HOLD | COMMUNITY
Start: 2012-05-18

## 2025-04-09 RX ORDER — ISOSORBIDE MONONITRATE 30 MG/1
1 TABLET IN THE MORNING TABLET, EXTENDED RELEASE ORAL ONCE A DAY
Status: ON HOLD | COMMUNITY

## 2025-04-09 RX ORDER — ALBUTEROL SULFATE 90 UG/1
AEROSOL, METERED RESPIRATORY (INHALATION)
Refills: 0 | Status: ACTIVE | COMMUNITY
Start: 2012-05-18

## 2025-04-09 RX ORDER — THEOPHYLLINE 300 MG/1
TAKE 1/2 TABLET BY MOUTH EVERY 12 HOURS TABLET, EXTENDED RELEASE ORAL
Qty: 90 EACH | Refills: 2 | Status: ACTIVE | COMMUNITY

## 2025-04-09 RX ORDER — BENZONATATE 100 MG/1
1 CAPSULE AS NEEDED CAPSULE ORAL TWICE A DAY
Status: ACTIVE | COMMUNITY

## 2025-04-09 RX ORDER — LINAGLIPTIN 5 MG/1
1 TABLET TABLET, FILM COATED ORAL ONCE A DAY
Status: ACTIVE | COMMUNITY

## 2025-04-09 RX ORDER — EVOLOCUMAB 140 MG/ML
1 ML INJECTION, SOLUTION SUBCUTANEOUS
Status: ACTIVE | COMMUNITY

## 2025-04-09 RX ORDER — ALUMINUM HYDROXIDE AND MAGNESIUM CARBONATE 95; 358 MG/15ML; MG/15ML
15 ML AFTER MEALS AND AT BEDTIME AS NEEDED LIQUID ORAL
OUTPATIENT
Start: 2024-09-06

## 2025-04-09 RX ORDER — FAMOTIDINE 10 MG
1 (ONE) TABLET ORAL
Qty: 0 | Refills: 16 | Status: ON HOLD | COMMUNITY
Start: 2018-02-28

## 2025-04-09 RX ORDER — AMOXICILLIN 500 MG/1
2 (TWO) CAPSULE ORAL
Qty: 0 | Refills: 2 | Status: ON HOLD | COMMUNITY
Start: 2018-09-17

## 2025-04-09 RX ORDER — ALUMINUM HYDROXIDE AND MAGNESIUM CARBONATE 95; 358 MG/15ML; MG/15ML
15 ML AFTER MEALS AND AT BEDTIME AS NEEDED LIQUID ORAL
Status: ACTIVE | COMMUNITY
Start: 2024-09-06

## 2025-04-09 RX ORDER — FLUTICASONE PROPIONATE AND SALMETEROL 50; 250 UG/1; UG/1
POWDER RESPIRATORY (INHALATION)
Refills: 0 | Status: ACTIVE | COMMUNITY
Start: 2012-05-18

## 2025-04-09 RX ORDER — PANTOPRAZOLE 20 MG/1
1 (ONE) TABLET, DELAYED RELEASE ORAL
Qty: 0 | Refills: 16 | Status: ON HOLD | COMMUNITY
Start: 2020-05-22

## 2025-04-09 RX ORDER — BISMUTH SUBSALICYLATE 262 MG/1
2 (TWO) TABLET, CHEWABLE ORAL
Qty: 0 | Refills: 2 | Status: ON HOLD | COMMUNITY
Start: 2018-03-14

## 2025-04-09 RX ORDER — NIFEDIPINE 60 MG/1
1 TABLET ON AN EMPTY STOMACH TABLET, EXTENDED RELEASE ORAL TWICE A DAY
Status: ACTIVE | COMMUNITY

## 2025-04-09 RX ORDER — METRONIDAZOLE 500 MG/1
1 (ONE) TABLET ORAL
Qty: 0 | Refills: 2 | Status: ON HOLD | COMMUNITY
Start: 2018-09-27

## 2025-04-09 RX ORDER — TRAMADOL HYDROCHLORIDE 50 MG/1
TABLET ORAL
Refills: 0 | Status: ON HOLD | COMMUNITY
Start: 2012-05-18

## 2025-04-09 RX ORDER — MONTELUKAST SODIUM 10 MG/1
TAKE 1 TABLET BY MOUTH EVERY DAY AT BEDTIME TABLET, FILM COATED ORAL
Qty: 90 EACH | Refills: 0 | Status: ON HOLD | COMMUNITY

## 2025-04-09 RX ORDER — CLONIDINE HYDROCHLORIDE 0.2 MG/1
TABLET ORAL
Refills: 0 | Status: ON HOLD | COMMUNITY
Start: 2012-05-18

## 2025-04-09 NOTE — HPI-TODAY'S VISIT:
Pt here for f/u of pancreatic cyst and also IBS and GERD. . 2018 Due to abd pain CT done showing a 11 mm cyst in the body with upstream PD dilation. MRI done showing similar findings. 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 Nichols planning surgical eval post covid Pancreatic resection in1 2/2020 with IPMN with LGD, now followed closely by Dr. Nichols . 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 Morrison, repeat 3 years EGD 1/2022 with HP neg gastritis Colon fall 2022 clear 2023 EGD with no stricture, no HP, no vora's 2024 EGD with small HH, no stricture, no vora's, dilation improved dysphagia . S/p ccx MIld BEATTY at times . Pt doing well on the linzess. She is on pantoprazole with breakthrough gerd about 1-2 x per week. Will cont. She is not on fiber and does have some stool consistency issues that I advised that we start fiber with her miralax. She follows with Dr. Nichols for her pancreatic cyst. RTC 6 mo. . .

## 2025-08-20 ENCOUNTER — TELEPHONE ENCOUNTER (OUTPATIENT)
Dept: URBAN - METROPOLITAN AREA CLINIC 9 | Facility: CLINIC | Age: 80
End: 2025-08-20

## 2025-08-21 ENCOUNTER — OFFICE VISIT (OUTPATIENT)
Dept: URBAN - METROPOLITAN AREA CLINIC 9 | Facility: CLINIC | Age: 80
End: 2025-08-21
Payer: COMMERCIAL

## 2025-08-21 DIAGNOSIS — R13.19 CERVICAL DYSPHAGIA: ICD-10-CM

## 2025-08-21 DIAGNOSIS — K58.2 IRRITABLE BOWEL SYNDROME WITH BOTH CONSTIPATION AND DIARRHEA: ICD-10-CM

## 2025-08-21 DIAGNOSIS — D49.0 IPMN (INTRADUCTAL PAPILLARY MUCINOUS NEOPLASM): ICD-10-CM

## 2025-08-21 DIAGNOSIS — K21.9 GASTROESOPHAGEAL REFLUX DISEASE WITHOUT ESOPHAGITIS: ICD-10-CM

## 2025-08-21 PROCEDURE — 99214 OFFICE O/P EST MOD 30 MIN: CPT | Performed by: PHYSICIAN ASSISTANT

## 2025-08-21 RX ORDER — NIFEDIPINE 60 MG/1
1 TABLET ON AN EMPTY STOMACH TABLET, EXTENDED RELEASE ORAL TWICE A DAY
Status: ACTIVE | COMMUNITY

## 2025-08-21 RX ORDER — MONTELUKAST SODIUM 10 MG/1
TAKE 1 TABLET BY MOUTH EVERY DAY AT BEDTIME TABLET, FILM COATED ORAL
Qty: 90 EACH | Refills: 0 | Status: ON HOLD | COMMUNITY

## 2025-08-21 RX ORDER — FAMOTIDINE 10 MG
1 (ONE) TABLET ORAL
Qty: 0 | Refills: 16 | Status: ON HOLD | COMMUNITY
Start: 2018-02-28

## 2025-08-21 RX ORDER — LORATADINE 5 MG
17 GRAMS TABLET,CHEWABLE ORAL
Status: ACTIVE | COMMUNITY
Start: 2021-04-26

## 2025-08-21 RX ORDER — RIFABUTIN 150 MG/1
2 (TWO) CAPSULE ORAL
Qty: 0 | Refills: 2 | Status: ON HOLD | COMMUNITY
Start: 2019-06-26

## 2025-08-21 RX ORDER — BISMUTH SUBSALICYLATE 262 MG/1
2 (TWO) TABLET, CHEWABLE ORAL
Qty: 0 | Refills: 2 | Status: ON HOLD | COMMUNITY
Start: 2018-03-14

## 2025-08-21 RX ORDER — PANTOPRAZOLE 20 MG/1
1 (ONE) TABLET, DELAYED RELEASE ORAL
Qty: 0 | Refills: 16 | Status: ON HOLD | COMMUNITY
Start: 2020-05-22

## 2025-08-21 RX ORDER — LEVOFLOXACIN 500 MG/1
1 (ONE) TABLET, FILM COATED ORAL DAILY
Qty: 0 | Refills: 2 | Status: ON HOLD | COMMUNITY
Start: 2018-09-17

## 2025-08-21 RX ORDER — PEPPERMINT OIL 90 MG
1-2 CAPSULE, DELAYED, AND EXTENDED RELEASE ORAL
Qty: 0 | Refills: 2 | Status: ON HOLD | COMMUNITY
Start: 2020-06-09

## 2025-08-21 RX ORDER — FAMOTIDINE 40 MG/1
ONE PO QHS TABLET, FILM COATED ORAL
Refills: 2 | Status: ON HOLD | COMMUNITY
Start: 2013-01-23

## 2025-08-21 RX ORDER — ALBUTEROL SULFATE 90 UG/1
AEROSOL, METERED RESPIRATORY (INHALATION)
Refills: 0 | Status: ACTIVE | COMMUNITY
Start: 2012-05-18

## 2025-08-21 RX ORDER — THEOPHYLLINE 300 MG/1
TAKE 1/2 TABLET BY MOUTH EVERY 12 HOURS TABLET, EXTENDED RELEASE ORAL
Qty: 90 EACH | Refills: 2 | Status: ACTIVE | COMMUNITY

## 2025-08-21 RX ORDER — SITAGLIPTIN 100 MG/1
TAKE 1 TABLET BY MOUTH EVERY DAY TABLET, FILM COATED ORAL
Qty: 90 EACH | Refills: 1 | Status: ON HOLD | COMMUNITY

## 2025-08-21 RX ORDER — CLONIDINE HYDROCHLORIDE 0.2 MG/1
TABLET ORAL
Refills: 0 | Status: ON HOLD | COMMUNITY
Start: 2012-05-18

## 2025-08-21 RX ORDER — OMEPRAZOLE 20 MG/1
TAKE ONE CAPSULE BY MOUTH TWICE DAILY CAPSULE, DELAYED RELEASE ORAL
Refills: 0 | Status: ON HOLD | COMMUNITY
Start: 2014-08-04

## 2025-08-21 RX ORDER — WHEAT DEXTRIN 3 G/4 G
1 TABLESPOON POWDER (GRAM) ORAL TWICE A DAY
Status: ACTIVE | COMMUNITY
Start: 2018-02-28

## 2025-08-21 RX ORDER — ISOSORBIDE MONONITRATE 30 MG/1
1 TABLET IN THE MORNING TABLET, EXTENDED RELEASE ORAL ONCE A DAY
Status: ON HOLD | COMMUNITY

## 2025-08-21 RX ORDER — LINACLOTIDE 145 UG/1
1 CAPSULE AT LEAST 30 MINUTES BEFORE THE FIRST MEAL OF THE DAY ON AN EMPTY STOMACH CAPSULE, GELATIN COATED ORAL ONCE A DAY
Status: DISCONTINUED | COMMUNITY

## 2025-08-21 RX ORDER — HYDRALAZINE HYDROCHLORIDE 25 MG/1
1 TABLET WITH FOOD TABLET, FILM COATED ORAL TWICE A DAY
Status: ACTIVE | COMMUNITY

## 2025-08-21 RX ORDER — SUCRALFATE 1 G/1
1 (ONE) TABLET ORAL
Qty: 90 | Refills: 2 | Status: ON HOLD | COMMUNITY

## 2025-08-21 RX ORDER — SALSALATE 750 MG/1
TABLET, FILM COATED ORAL
Refills: 0 | Status: ON HOLD | COMMUNITY
Start: 2012-05-18

## 2025-08-21 RX ORDER — HYDROCORTISONE ACETATE 25 MG/1
_INSERT ONE INTO RECTUM QHS PRN SUPPOSITORY RECTAL TAKE AS DIRECTED
Refills: 1 | Status: ON HOLD | COMMUNITY
Start: 2012-06-04

## 2025-08-21 RX ORDER — FLUTICASONE PROPIONATE AND SALMETEROL 50; 250 UG/1; UG/1
1 PUFF POWDER RESPIRATORY (INHALATION) TWICE A DAY
Status: ON HOLD | COMMUNITY

## 2025-08-21 RX ORDER — METHOCARBAMOL 750 MG/1
1 TABLET TABLET ORAL
Status: DISCONTINUED | COMMUNITY

## 2025-08-21 RX ORDER — OMEGA-3-ACID ETHYL ESTERS 1 G/1
CAPSULE ORAL
Refills: 0 | Status: ON HOLD | COMMUNITY
Start: 2012-05-18

## 2025-08-21 RX ORDER — METOCLOPRAMIDE HYDROCHLORIDE 5 MG/1
TABLET ORAL
Refills: 0 | Status: ON HOLD | COMMUNITY
Start: 2012-05-18

## 2025-08-21 RX ORDER — DICYCLOMINE HYDROCHLORIDE 10 MG/1
1 CAPSULES CAPSULE ORAL
Qty: 90 | Refills: 3 | Status: ACTIVE | COMMUNITY

## 2025-08-21 RX ORDER — PANTOPRAZOLE SODIUM 40 MG/1
1 (ONE) TABLET, DELAYED RELEASE ORAL ONCE A DAY
Status: ACTIVE | COMMUNITY

## 2025-08-21 RX ORDER — CARVEDILOL 25 MG/1
1 TABLET WITH FOOD TABLET, FILM COATED ORAL TWICE A DAY
Status: ACTIVE | COMMUNITY

## 2025-08-21 RX ORDER — ALUMINUM HYDROXIDE AND MAGNESIUM CARBONATE 95; 358 MG/15ML; MG/15ML
15 ML AFTER MEALS AND AT BEDTIME AS NEEDED LIQUID ORAL
Status: DISCONTINUED | COMMUNITY
Start: 2024-09-06

## 2025-08-21 RX ORDER — VIBEGRON 75 MG/1
1 TABLET TABLET, FILM COATED ORAL ONCE A DAY
Status: ACTIVE | COMMUNITY

## 2025-08-21 RX ORDER — FLUTICASONE PROPIONATE AND SALMETEROL 50; 250 UG/1; UG/1
POWDER RESPIRATORY (INHALATION)
Refills: 0 | Status: ACTIVE | COMMUNITY
Start: 2012-05-18

## 2025-08-21 RX ORDER — AMOXICILLIN AND CLAVULANATE POTASSIUM 600; 42.9 MG/5ML; MG/5ML
1 (ONE) FOR SUSPENSION ORAL
Qty: 0 | Refills: 2 | Status: ON HOLD | COMMUNITY
Start: 2018-12-03

## 2025-08-21 RX ORDER — CETIRIZINE HYDROCHLORIDE 5 MG/1
1 TABLET TABLET, CHEWABLE ORAL ONCE A DAY
Status: ACTIVE | COMMUNITY

## 2025-08-21 RX ORDER — EVOLOCUMAB 140 MG/ML
1 ML INJECTION, SOLUTION SUBCUTANEOUS
Status: ACTIVE | COMMUNITY

## 2025-08-21 RX ORDER — BENZONATATE 100 MG/1
1 CAPSULE AS NEEDED CAPSULE ORAL TWICE A DAY
Status: DISCONTINUED | COMMUNITY

## 2025-08-21 RX ORDER — TRAMADOL HYDROCHLORIDE 50 MG/1
TABLET ORAL
Refills: 0 | Status: ON HOLD | COMMUNITY
Start: 2012-05-18

## 2025-08-21 RX ORDER — DOXYCYCLINE HYCLATE 100 MG/1
1 (ONE) CAPSULE ORAL
Qty: 0 | Refills: 2 | Status: ON HOLD | COMMUNITY
Start: 2018-03-15

## 2025-08-21 RX ORDER — LABETALOL HYDROCHLORIDE 300 MG/1
TABLET, FILM COATED ORAL
Refills: 0 | Status: ON HOLD | COMMUNITY
Start: 2012-05-18

## 2025-08-21 RX ORDER — HYDROCHLOROTHIAZIDE 25 MG/1
TAKE 1 TABLET BY MOUTH DAILY TABLET ORAL
Qty: 90 EACH | Refills: 0 | Status: ON HOLD | COMMUNITY

## 2025-08-21 RX ORDER — METRONIDAZOLE 250 MG/1
1 (ONE) TABLET ORAL
Qty: 0 | Refills: 2 | Status: ON HOLD | COMMUNITY
Start: 2018-03-14

## 2025-08-21 RX ORDER — OMEPRAZOLE 40 MG/1
CAPSULE, DELAYED RELEASE ORAL TWICE A DAY
Refills: 2 | Status: ON HOLD | COMMUNITY
Start: 2012-06-04

## 2025-08-21 RX ORDER — AMOXICILLIN 500 MG/1
2 (TWO) CAPSULE ORAL
Qty: 0 | Refills: 2 | Status: ON HOLD | COMMUNITY
Start: 2018-09-17

## 2025-08-21 RX ORDER — LINAGLIPTIN 5 MG/1
1 TABLET TABLET, FILM COATED ORAL ONCE A DAY
Status: ACTIVE | COMMUNITY

## 2025-08-21 RX ORDER — HYDROXYZINE HYDROCHLORIDE 10 MG/1
TABLET ORAL
Refills: 0 | Status: ON HOLD | COMMUNITY
Start: 2012-05-18

## 2025-08-21 RX ORDER — METFORMIN HCL 500 MG/1
1 TABLET WITH A MEAL TABLET ORAL TWICE A DAY
Refills: 0 | Status: ACTIVE | COMMUNITY
Start: 2012-05-18

## 2025-08-21 RX ORDER — METRONIDAZOLE 500 MG/1
1 (ONE) TABLET ORAL
Qty: 0 | Refills: 2 | Status: ON HOLD | COMMUNITY
Start: 2018-09-27